# Patient Record
Sex: FEMALE | Race: BLACK OR AFRICAN AMERICAN | URBAN - METROPOLITAN AREA
[De-identification: names, ages, dates, MRNs, and addresses within clinical notes are randomized per-mention and may not be internally consistent; named-entity substitution may affect disease eponyms.]

---

## 2017-02-14 ENCOUNTER — OFFICE VISIT (OUTPATIENT)
Dept: URGENT CARE | Facility: URGENT CARE | Age: 22
End: 2017-02-14
Payer: COMMERCIAL

## 2017-02-14 VITALS
DIASTOLIC BLOOD PRESSURE: 68 MMHG | HEART RATE: 83 BPM | TEMPERATURE: 97.5 F | BODY MASS INDEX: 27.02 KG/M2 | WEIGHT: 157.4 LBS | SYSTOLIC BLOOD PRESSURE: 98 MMHG

## 2017-02-14 DIAGNOSIS — G44.209 TENSION-TYPE HEADACHE, NOT INTRACTABLE, UNSPECIFIED CHRONICITY PATTERN: Primary | ICD-10-CM

## 2017-02-14 DIAGNOSIS — J01.90 ACUTE SINUSITIS WITH SYMPTOMS > 10 DAYS: ICD-10-CM

## 2017-02-14 PROCEDURE — 99214 OFFICE O/P EST MOD 30 MIN: CPT | Performed by: PHYSICIAN ASSISTANT

## 2017-02-14 NOTE — MR AVS SNAPSHOT
After Visit Summary   2/14/2017    Silverio Stroud    MRN: [id number]           Patient Information     Date Of Birth          1995        Visit Information        Provider Department      2/14/2017 5:30 PM Tye Moon PA-C [facility]        Today's Diagnoses     Tension-type headache, not intractable, unspecified chronicity pattern    -  1    Acute sinusitis with symptoms > 10 days          Care Instructions       Antibiotic x10 days  Probiotic x 10 days  Ibuprofen 600mg three times a day for 2-3 days  Follow up with Primary care provider       HEADACHE [unspecified]    The cause of your headache today is not clear, but it does not appear to be the sign of any serious illness.  Under stress, some people tense the muscles of their shoulder, neck and scalp without knowing it. If this condition lasts long enough, a TENSION HEADACHE can occur.  A MIGRAINE HEADACHE is caused by changes in blood flow to the brain. It can be mild or severe. A migraine attack may be triggered by emotional stress, hormone changes during the menstrual cycle, oral contraceptives, alcohol use, certain foods containing tyramine, eye strain, weather changes, missing meals, lack of sleep or oversleeping.  Other causes of headache include a viral illness, sinus, ear or throat infection, dental pain and TMJ (jaw joint) pain.  HOME CARE:    If you were given pain medicine for this headache, do not drive yourself home. Arrange for a ride, instead. When you get home, try to sleep. You should feel much better when you wake up.    If you are having nausea or vomiting, follow a light diet until your headache is relieved.    If you have a migraine type headache, use sunglasses when in the daylight or around bright indoor lighting until symptoms improve. Bright glaring light can worsen this kind of headache.  FOLLOW UP with your doctor if the headache is not better within the next 24 hours. If you have  frequent headaches you should discuss a treatment plan with your primary care doctor. By being aware of the earliest signs of headache, and starting treatment right away, you may be able to stop the pain yourself.  GET PROMPT MEDICAL ATTENTION if any of the following occur:    Worsening of your head pain or no improvement within 24 hours    Repeated vomiting (unable to keep liquids down)    Fever over 101 F (38.3 C)    Stiff neck    Extreme drowsiness, confusion or fainting    Weakness of an arm or leg or one side of the face    Difficulty with speech or vision    0766-9359 [facility], [address] [place] [address]1939. All rights reserved. This information is not intended as a substitute for professional medical care. Always follow your healthcare professional's instructions.    Self-Care for Headaches  Most headaches aren't serious and can be relieved with self-care. But some headaches may be a sign of another health problem like eye trouble or high blood pressure. To find the best treatment, learn what kind of headaches you get. For tension headaches, self-care will usually help. To treat migraines, ask your healthcare provider for advice. It is also possible to get both tension and migraine headaches. Self-care involves relieving the pain and avoiding headache  triggers  if you can.    Ways to reduce pain and tension  Try these steps:    Apply a cold compress or ice pack to the pain site.    Drink fluids. If nausea makes it hard to drink, try sucking on ice.    Rest. Protect yourself from bright light and loud noises.    Calm your emotions by imagining a peaceful scene.    Massage tight neck, shoulder, and head muscles.    To relax muscles, soak in a hot bath or use a hot shower.  Use medicines  Aspirin or aspirin substitutes, such as ibuprofen and acetaminophen, can relieve headache. Remember: Never give aspirin to anyone 18 years old or younger because of the risk of developing Reye syndrome. Use  "pain medicines only when necessary.  Track your headaches  Keeping a headache diary can help you and your healthcare provider identify what's causing your headaches:    Note when each headache happens.    Identify your activities and the foods you've eaten 6 to 8 hours before the headache began.    Look for any trends or \"triggers.\"  Signs of tension headache  Any of the following can be signs:    Dull pain or feeling of pressure in a tight band around your head    Pain in your neck or shoulders    Headache without a definite beginning or end    Headache after an activity such as driving or working on a computer  Signs of migraine  Any of the following can be signs:    Throbbing pain on one or both sides of your head    Nausea or vomiting    Extreme sensitivity to light, sound, and smells    Bright spots, flashes, or other visual changes    Pain or nausea so severe that you can't continue your daily activities  Call your healthcare provider   If you have any of the following symptoms, contact your healthcare provider:    A headache that lingers after a recent injury or bump to the head.    A fever with a stiff neck or pain when you bend your head toward your chest.    A headache along with slurred speech, changes in your vision, or numbness or weakness in your arms or legs.    A headache for longer than 3 days.    Frequent headaches, especially in the morning.    Headaches with seizures     Seek immediate medical attention if you have a headache that you would call \"the worst headache you have ever had.\"     6222-2536 [employer]. [address] [place] [address]. All rights reserved. This information is not intended as a substitute for professional medical care. Always follow your healthcare professional's instructions.        Sinus Headache    The sinuses are air-filled spaces within the bones of the face. They connect to the inside of the nose. Sinusitis is an inflammation of the tissue " lining the sinus cavity. Sinus inflammation can occur during a cold or hay fever (allergies to pollens and other particles in the air) and cause symptoms of sinus congestion and fullness and perhaps a low-grade fever. An infection is usually present when there is also facial pain or headache and green or yellow drainage from the nose or into the back of the throat (postnasal drip). Antibiotics are often prescribed to treat this condition.  Sinus headache may cause pain in different places, depending on which sinuses are infected. There may be pain in the temples, forehead, top of the head, behind or around the eye, across the cheekbone, or into the upper teeth.  You may find that changing your position, sitting upright or lying down, will bring some relief.  Home care  The following guidelines will help you care for yourself at home:  1. Drink plenty of water, hot tea, and other liquids to stay well hydrated. This thins the mucus and promotes sinus drainage.  2. Apply heat to the painful areas of the face. Use a towel soaked in hot water. Or,  the shower and direct the hot spray onto your face. This is a good way to inhale warm water vapor and get heat on your face at the same time. (Cover your mouth and nose with your hands so you can still breathe as you do this.)  3. Use a cool mist vaporizer at night or breathe in steam from a hot shower. Suck on peppermint, menthol, or eucalyptus hard candies during the day.  4. An expectorant containing guaifenesin helps thin the mucus and promote drainage from the sinuses.  5. Over-the-counter decongestants may be used unless a similar medicine was prescribed. Nasal sprays work the fastest. Use one that contains phenylephrine or oxymetazoline. First blow the nose gently to remove mucus. Then apply the drops. Don't use decongestant nasal sprays more often than the label says or for more than 3 days. This can make symptoms worse. Nasal sprays prescribed from your doctor  typically do not have these restrictions. Check with your doctor or pharmacist. You may also use tablets containing pseudoephedrine. Many sinus remedies combine ingredients, which may increase side effects. Also, if you are taking a combination medicine with another medicine, be sure you are not taking a double dose of anything by mistake. Read the labels or ask the pharmacist for help. [NOTE: Those with high blood pressure should not use decongestants. They can raise blood pressure.]  6. Antihistamines are useful if allergies are a cause of your sinusitis. You can get chlorpheniramine and diphenhydramine over the counter, but these can cause drowsiness. [NOTE: Don't use these if you have glaucoma or if you are a man with trouble urinating due to an enlarged prostate.] Over-the-counter antihistamines containing loratidine and cetirizine cause less drowsiness and are a good choice for daytime use.  7. When allergies are the cause for sinusitis, a saline nasal rinse may give relief. Saline nasal rinse reduces swelling and clears excess mucus. This allows sinuses to drain. Prepackaged kits are available at most drugstores. These contain premixed salt packets and an irrigation device. If antibiotics have been prescribed to treat an acute sinus infection, talk to your doctor before using a nasal rinse to be sure it is safe for you.  8. You may use acetaminophen or ibuprofen to control pain, unless another pain medicine was prescribed. [NOTE: If you have chronic liver or kidney disease or ever had a stomach ulcer, talk with your doctor before using these medicines.] (Aspirin should never be used in anyone under 18 years of age who has a fever. It may cause severe liver damage.)  9. If antibiotics were given, finish all of them, even if you are feeling better after a few days.  Follow-up care  Follow up with your doctor or this facility in 1 week or as instructed by our staff if not improving.  When to seek medical  care  Get prompt medical attention if any of the following occur:    Facial pain or headache becomes more severe    Stiff neck    Unusual drowsiness or confusion    Swelling of the forehead or eyelids    Vision problems including blurred or double vision    Fever over 100.4  F (38.0  C) oral for more than 3 days on antibiotics    Bleeding from the nose or throat    Seizure    3599-6032 [employer]. [address] [place] [address]. All rights reserved. This information is not intended as a substitute for professional medical care. Always follow your healthcare professional's instructions.              Follow-ups after your visit        Your next 10 appointments already scheduled     Feb 17, 2017  1:20 PM CST   Office Visit with Dasia Atwood MD   [facility]    [address] [place] [address]  [address]  [facility] [address] [phone]           Bring a current list of meds and any records pertaining to this visit.  For Physicals, please bring immunization records and any forms needing to be filled out.  Please arrive 10 minutes early to complete paperwork.              Who to contact     If you have questions or need follow up information about today's clinic visit or your schedule please contact [facility] directly at [phone].  Normal or non-critical lab and imaging results will be communicated to you by MyChart, letter or phone within 4 business days after the clinic has received the results. If you do not hear from us within 7 days, please contact the clinic through MyChart or phone. If you have a critical or abnormal lab result, we will notify you by phone as soon as possible.  Submit refill requests through [employer] or call your pharmacy and they will forward the refill request to us. Please allow 3 business days for your refill to be completed.          Additional Information About Your Visit        MyChart Information      [employer] gives you secure access to your electronic health record. If you see a primary care provider, you can also send messages to your care team and make appointments. If you have questions, please call your primary care clinic.  If you do not have a primary care provider, please call [phone] and they will assist you.        Care EveryWhere ID     This is your Care EveryWhere ID. This could be used by other organizations to access your [facility] medical records  [id number]        Your Vitals Were     Pulse Temperature BMI (Body Mass Index)             83 97.5  F (36.4  C) (Oral) 27.02 kg/m2          Blood Pressure from Last 3 Encounters:   02/14/17 98/68   12/06/16 120/78   11/15/16 110/64    Weight from Last 3 Encounters:   02/14/17 157 lb 6.4 oz (71.4 kg)   12/06/16 162 lb 4 oz (73.6 kg)   11/15/16 161 lb (73 kg)              We Performed the Following     CBC with platelets          Today's Medication Changes          These changes are accurate as of: 2/14/17  6:56 PM.  If you have any questions, ask your nurse or doctor.               Start taking these medicines.        Dose/Directions    amoxicillin-clavulanate 875-125 MG per tablet   Commonly known as:  AUGMENTIN   Used for:  Acute sinusitis with symptoms > 10 days   Started by:  Tye Moon PA-C        Dose:  1 tablet   Take 1 tablet by mouth 2 times daily for 10 days   Quantity:  20 tablet   Refills:  0            Where to get your medicines      [id number]  [facility] - [address] [facility]  [address] [address]     Phone:  [phone]     amoxicillin-clavulanate 875-125 MG per tablet                Primary Care Provider Office Phone # Fax #    [facility] [phone]       [address] [place] [address] Dr. ROSA DASILVA [id number]        Thank you!     Thank you for choosing [facility]  for your care. Our goal is always to  provide you with excellent care. Hearing back from our patients is one way we can continue to improve our services. Please take a few minutes to complete the written survey that you may receive in the mail after your visit with us. Thank you!             Your Updated Medication List - Protect others around you: Learn how to safely use, store and throw away your medicines at www.disposemymeds.org.          This list is accurate as of: 2/14/17  6:56 PM.  Always use your most recent med list.                   Brand Name Dispense Instructions for use    amoxicillin-clavulanate 875-125 MG per tablet    AUGMENTIN    20 tablet    Take 1 tablet by mouth 2 times daily for 10 days       Calcium Carb-Cholecalciferol 600-800 MG-UNIT Tabs     120 tablet    Take 1 tablet by mouth daily       CALCIUM CITRATE + D PO          cholecalciferol 68279 UNITS capsule    VITAMIN D3    8 capsule    Take 1 capsule (50,000 Units) by mouth once a week       clindamycin 1 % solution    CLEOCIN T    60 mL    Apply topically 2 times daily       levonorgestrel-ethinyl estradiol 0.1-20 MG-MCG per tablet    AVIANE,ALESSE,LESSINA    84 tablet    Take 1 tablet by mouth daily       traZODone 50 MG tablet    DESYREL    30 tablet    Take 1 tablet (50 mg) by mouth nightly as needed for sleep

## 2017-02-15 NOTE — PATIENT INSTRUCTIONS
Antibiotic x10 days  Probiotic x 10 days  Ibuprofen 600mg three times a day for 2-3 days  Follow up with Primary care provider       HEADACHE [unspecified]    The cause of your headache today is not clear, but it does not appear to be the sign of any serious illness.  Under stress, some people tense the muscles of their shoulder, neck and scalp without knowing it. If this condition lasts long enough, a TENSION HEADACHE can occur.  A MIGRAINE HEADACHE is caused by changes in blood flow to the brain. It can be mild or severe. A migraine attack may be triggered by emotional stress, hormone changes during the menstrual cycle, oral contraceptives, alcohol use, certain foods containing tyramine, eye strain, weather changes, missing meals, lack of sleep or oversleeping.  Other causes of headache include a viral illness, sinus, ear or throat infection, dental pain and TMJ (jaw joint) pain.  HOME CARE:    If you were given pain medicine for this headache, do not drive yourself home. Arrange for a ride, instead. When you get home, try to sleep. You should feel much better when you wake up.    If you are having nausea or vomiting, follow a light diet until your headache is relieved.    If you have a migraine type headache, use sunglasses when in the daylight or around bright indoor lighting until symptoms improve. Bright glaring light can worsen this kind of headache.  FOLLOW UP with your doctor if the headache is not better within the next 24 hours. If you have frequent headaches you should discuss a treatment plan with your primary care doctor. By being aware of the earliest signs of headache, and starting treatment right away, you may be able to stop the pain yourself.  GET PROMPT MEDICAL ATTENTION if any of the following occur:    Worsening of your head pain or no improvement within 24 hours    Repeated vomiting (unable to keep liquids down)    Fever over 101 F (38.3 C)    Stiff neck    Extreme drowsiness, confusion or  "fainting    Weakness of an arm or leg or one side of the face    Difficulty with speech or vision    5518-1821 [facility], [address] [place] [address], [place] [address]. All rights reserved. This information is not intended as a substitute for professional medical care. Always follow your healthcare professional's instructions.    Self-Care for Headaches  Most headaches aren't serious and can be relieved with self-care. But some headaches may be a sign of another health problem like eye trouble or high blood pressure. To find the best treatment, learn what kind of headaches you get. For tension headaches, self-care will usually help. To treat migraines, ask your healthcare provider for advice. It is also possible to get both tension and migraine headaches. Self-care involves relieving the pain and avoiding headache  triggers  if you can.    Ways to reduce pain and tension  Try these steps:    Apply a cold compress or ice pack to the pain site.    Drink fluids. If nausea makes it hard to drink, try sucking on ice.    Rest. Protect yourself from bright light and loud noises.    Calm your emotions by imagining a peaceful scene.    Massage tight neck, shoulder, and head muscles.    To relax muscles, soak in a hot bath or use a hot shower.  Use medicines  Aspirin or aspirin substitutes, such as ibuprofen and acetaminophen, can relieve headache. Remember: Never give aspirin to anyone 18 years old or younger because of the risk of developing Reye syndrome. Use pain medicines only when necessary.  Track your headaches  Keeping a headache diary can help you and your healthcare provider identify what's causing your headaches:    Note when each headache happens.    Identify your activities and the foods you've eaten 6 to 8 hours before the headache began.    Look for any trends or \"triggers.\"  Signs of tension headache  Any of the following can be signs:    Dull pain or feeling of pressure in a tight band around your " "head    Pain in your neck or shoulders    Headache without a definite beginning or end    Headache after an activity such as driving or working on a computer  Signs of migraine  Any of the following can be signs:    Throbbing pain on one or both sides of your head    Nausea or vomiting    Extreme sensitivity to light, sound, and smells    Bright spots, flashes, or other visual changes    Pain or nausea so severe that you can't continue your daily activities  Call your healthcare provider   If you have any of the following symptoms, contact your healthcare provider:    A headache that lingers after a recent injury or bump to the head.    A fever with a stiff neck or pain when you bend your head toward your chest.    A headache along with slurred speech, changes in your vision, or numbness or weakness in your arms or legs.    A headache for longer than 3 days.    Frequent headaches, especially in the morning.    Headaches with seizures     Seek immediate medical attention if you have a headache that you would call \"the worst headache you have ever had.\"     5814-7453 [employer]. [address] [place] [address]. All rights reserved. This information is not intended as a substitute for professional medical care. Always follow your healthcare professional's instructions.        Sinus Headache    The sinuses are air-filled spaces within the bones of the face. They connect to the inside of the nose. Sinusitis is an inflammation of the tissue lining the sinus cavity. Sinus inflammation can occur during a cold or hay fever (allergies to pollens and other particles in the air) and cause symptoms of sinus congestion and fullness and perhaps a low-grade fever. An infection is usually present when there is also facial pain or headache and green or yellow drainage from the nose or into the back of the throat (postnasal drip). Antibiotics are often prescribed to treat this condition.  Sinus headache may " cause pain in different places, depending on which sinuses are infected. There may be pain in the temples, forehead, top of the head, behind or around the eye, across the cheekbone, or into the upper teeth.  You may find that changing your position, sitting upright or lying down, will bring some relief.  Home care  The following guidelines will help you care for yourself at home:  1. Drink plenty of water, hot tea, and other liquids to stay well hydrated. This thins the mucus and promotes sinus drainage.  2. Apply heat to the painful areas of the face. Use a towel soaked in hot water. Or,  the shower and direct the hot spray onto your face. This is a good way to inhale warm water vapor and get heat on your face at the same time. (Cover your mouth and nose with your hands so you can still breathe as you do this.)  3. Use a cool mist vaporizer at night or breathe in steam from a hot shower. Suck on peppermint, menthol, or eucalyptus hard candies during the day.  4. An expectorant containing guaifenesin helps thin the mucus and promote drainage from the sinuses.  5. Over-the-counter decongestants may be used unless a similar medicine was prescribed. Nasal sprays work the fastest. Use one that contains phenylephrine or oxymetazoline. First blow the nose gently to remove mucus. Then apply the drops. Don't use decongestant nasal sprays more often than the label says or for more than 3 days. This can make symptoms worse. Nasal sprays prescribed from your doctor typically do not have these restrictions. Check with your doctor or pharmacist. You may also use tablets containing pseudoephedrine. Many sinus remedies combine ingredients, which may increase side effects. Also, if you are taking a combination medicine with another medicine, be sure you are not taking a double dose of anything by mistake. Read the labels or ask the pharmacist for help. [NOTE: Those with high blood pressure should not use decongestants. They  can raise blood pressure.]  6. Antihistamines are useful if allergies are a cause of your sinusitis. You can get chlorpheniramine and diphenhydramine over the counter, but these can cause drowsiness. [NOTE: Don't use these if you have glaucoma or if you are a man with trouble urinating due to an enlarged prostate.] Over-the-counter antihistamines containing loratidine and cetirizine cause less drowsiness and are a good choice for daytime use.  7. When allergies are the cause for sinusitis, a saline nasal rinse may give relief. Saline nasal rinse reduces swelling and clears excess mucus. This allows sinuses to drain. Prepackaged kits are available at most drugstores. These contain premixed salt packets and an irrigation device. If antibiotics have been prescribed to treat an acute sinus infection, talk to your doctor before using a nasal rinse to be sure it is safe for you.  8. You may use acetaminophen or ibuprofen to control pain, unless another pain medicine was prescribed. [NOTE: If you have chronic liver or kidney disease or ever had a stomach ulcer, talk with your doctor before using these medicines.] (Aspirin should never be used in anyone under 18 years of age who has a fever. It may cause severe liver damage.)  9. If antibiotics were given, finish all of them, even if you are feeling better after a few days.  Follow-up care  Follow up with your doctor or this facility in 1 week or as instructed by our staff if not improving.  When to seek medical care  Get prompt medical attention if any of the following occur:    Facial pain or headache becomes more severe    Stiff neck    Unusual drowsiness or confusion    Swelling of the forehead or eyelids    Vision problems including blurred or double vision    Fever over 100.4  F (38.0  C) oral for more than 3 days on antibiotics    Bleeding from the nose or throat    Seizure    7450-2292 [employer]. [address] [place] [address], [place] [address]. All rights  reserved. This information is not intended as a substitute for professional medical care. Always follow your healthcare professional's instructions.

## 2017-02-15 NOTE — PROGRESS NOTES
SUBJECTIVE:  Silverio Stroud is a 21 year old female who comes in for evaluation of headache.  Headache began 2week(s)}ago and is intermittent episodes lasting hours at a time.      DESCRIPTION OF HEADACHE:   Location of pain: bilateral   Radiation of pain?: NO   Character of pain:pressure-like   Severity of pain: moderate   Accompanying symptoms: sinus congestion, facial pressure   Prodromal sx?: none   Rapidity of onset: gradual     History of Migranes: No   Are most headaches similar in presentation? YES    TYPICAL PRECIPITANTS OF HEADACHE: stress and caffeine withdrawal    CURRENT USE OF MEDS TO TREAT HA:   Abortive meds? none today   Daily use? NO   Prophylactic meds? none    ADDITIONAL RELEVANT HISTORY:  Exposure to carbon monoxide? NO  Substance use: caffeine: 1-2 cups daily, none today  Neurologic ROS: lightheadedness    Past Medical History   Diagnosis Date     Elevated hemoglobin A1c      Irregular menses      Vitamin D deficiency      Current Outpatient Prescriptions   Medication Sig Dispense Refill     traZODone (DESYREL) 50 MG tablet Take 1 tablet (50 mg) by mouth nightly as needed for sleep 30 tablet 3     clindamycin (CLEOCIN T) 1 % external solution Apply topically 2 times daily 60 mL 11     levonorgestrel-ethinyl estradiol (AVIANE,ALESSE,LESSINA) 0.1-20 MG-MCG per tablet Take 1 tablet by mouth daily 84 tablet 3     Calcium Citrate-Vitamin D (CALCIUM CITRATE + D PO)        Calcium Carb-Cholecalciferol 600-800 MG-UNIT TABS Take 1 tablet by mouth daily 120 tablet 3     cholecalciferol (VITAMIN D3) 45899 UNITS capsule Take 1 capsule (50,000 Units) by mouth once a week 8 capsule 0     Social History   Substance Use Topics     Smoking status: Never Smoker     Smokeless tobacco: Not on file     Alcohol use No       ROS:   Review of systems negative except as stated above.      OBJECTIVE:  BP 98/68  Pulse 83  Temp 97.5  F (36.4  C) (Oral)  Wt 157 lb 6.4 oz (71.4 kg)  BMI 27.02 kg/m2  GENERAL APPEARANCE:  healthy, alert and no distress  EYES: EOMI,  PERRL, conjunctiva clear  HENT: Frontal sinus tenderness bilaterally ear canals and TM's normal.  Nose and mouth without ulcers, erythema or lesions  NECK: supple, nontender, no lymphadenopathy  RESP: lungs clear to auscultation - no rales, rhonchi or wheezes  CV: regular rates and rhythm, normal S1 S2, no murmur noted  NEURO: Normal strength and tone, sensory exam grossly normal,  normal speech and mentation  SKIN: no suspicious lesions or rashes    ASSESSMENT:(G44.209) Tension-type headache, not intractable, unspecified chronicity pattern  (primary encounter diagnosis)  Comment: Mild persistent presentation, bilateral, no neuro deficit.   Plan: Ice packs, Ibuprofen, relaxation techniques  Follow up with PCP if symptoms worsen or fail to improve with 2-3 days of therapy            (J01.90) Acute sinusitis with symptoms > 10 days  Comment:  Sinuus pressure  Plan: DISCONTINUED: amoxicillin-clavulanate         (AUGMENTIN) 875-125 MG per tablet        Probiototics  Follow up with PCP if symptoms worsen or fail to improve with 2-3 days of therapy

## 2017-02-17 ENCOUNTER — OFFICE VISIT (OUTPATIENT)
Dept: PEDIATRICS | Facility: CLINIC | Age: 22
End: 2017-02-17
Payer: COMMERCIAL

## 2017-02-17 VITALS
BODY MASS INDEX: 26.46 KG/M2 | DIASTOLIC BLOOD PRESSURE: 76 MMHG | TEMPERATURE: 97.9 F | SYSTOLIC BLOOD PRESSURE: 102 MMHG | OXYGEN SATURATION: 99 % | HEART RATE: 92 BPM | HEIGHT: 64 IN | WEIGHT: 155 LBS

## 2017-02-17 DIAGNOSIS — M94.0 COSTOCHONDRITIS: Primary | ICD-10-CM

## 2017-02-17 DIAGNOSIS — G44.209 TENSION HEADACHE: ICD-10-CM

## 2017-02-17 PROCEDURE — 99214 OFFICE O/P EST MOD 30 MIN: CPT | Performed by: PEDIATRICS

## 2017-02-17 NOTE — NURSING NOTE
"Chief Complaint   Patient presents with     Chest Pain     Headache       Initial /76 (BP Location: Right arm, Patient Position: Chair, Cuff Size: Adult Regular)  Pulse 92  Temp 97.9  F (36.6  C) (Oral)  Ht 5' 4\" (1.626 m)  Wt 155 lb (70.3 kg)  LMP 01/27/2017  SpO2 99%  Breastfeeding? No  BMI 26.61 kg/m2 Estimated body mass index is 26.61 kg/(m^2) as calculated from the following:    Height as of this encounter: 5' 4\" (1.626 m).    Weight as of this encounter: 155 lb (70.3 kg).  Medication Reconciliation: complete  "

## 2017-02-17 NOTE — PROGRESS NOTES
"  SUBJECTIVE:                                                    Silverio Stroud is a 21 year old female who presents to clinic today for the following health issues:    C/o HA x 2 weeks, top of head, left side and rt side.  Patient seen here 3 days ago for headache and was given anitbiotics for possible sinus infection. She did not start these - state she was nervous to take them.  No vision changes, no nausea or vomiting. Pain not awakening her from sleep.  She has constant pain throughout the day. She is currently a student in art school.  LMP 1/29 - headaches started around time of period.  Drinks moderate amount of water, has 1-2 lattes per day, no soda.       chest pain x 2 days - center of chest, pressure--no trauma, no relationship to food, not worse with exertion, no palpitations.  Has not tried anything for pain relief yet.    Problem list and histories reviewed & adjusted, as indicated.  Additional history: as documented    Problem list, Medication list, Allergies, and Medical/Social/Surgical histories reviewed in EPIC and updated as appropriate.    ROS:  Constitutional, HEENT, cardiovascular, pulmonary, gi and gu systems are negative, except as otherwise noted.    OBJECTIVE:                                                    /76 (BP Location: Right arm, Patient Position: Chair, Cuff Size: Adult Regular)  Pulse 92  Temp 97.9  F (36.6  C) (Oral)  Ht 5' 4\" (1.626 m)  Wt 155 lb (70.3 kg)  LMP 01/27/2017  SpO2 99%  Breastfeeding? No  BMI 26.61 kg/m2  Body mass index is 26.61 kg/(m^2).  GENERAL APPEARANCE: healthy, alert and no distress  EYES: Eyes grossly normal to inspection, PERRL and conjunctivae and sclerae normal  HENT: ear canals and TM's normal and nose and mouth without ulcers or lesions  NECK: no adenopathy, no asymmetry, masses, or scars and thyroid normal to palpation  RESP: lungs clear to auscultation - no rales, rhonchi or wheezes  CV: regular rates and rhythm, normal S1 S2, no S3 or S4 and " no murmur, click or rub  NEURO: Normal strength and tone, mentation intact, speech normal, DTR symmetrically normal in lower extremities, cranial nerves 2-12 intact and normal strength throughout  CHEST: +reproducable chest wall tenderness over upper sternum    Diagnostic Test Results:  none      ASSESSMENT/PLAN:                                                            1. Costochondritis  See PI    2. Tension headache  See PI - I don't think she has a sinus infection, does not need to take anitbiotics.       Patient Instructions     Headache:  1. Make sure your are drinking plenty of water - your urine should be clear  2. The ibuprofen you are taking for the costochondritis will help your headache.  3. If not feeling better in a week, please follow up with me.    Chest pain (costochondritis):  1. Take over the counter ibuprofen 400mg (2 tabs) three times per day with food.      Costochondritis  Costochondritis is inflammation of a rib or the cartilage that connects a rib to your breastbone (sternum). It causes tenderness, and sometimes chest pain may be sharp or aching, or it may feel like pressure. Pain may get worse with deep breathing, movement, or exercise. In some cases, the pain is mistaken for a heart attack. Despite this, the condition is not serious. Read on to learn more about the condition and how it can be treated.    What causes costochondritis?  The cause of costochondritis is not completely clear, but it may happen after a chest injury, chest infection or coughing episode. Some physical activities can sometimes lead to costochondritis. Large-breasted women may be more likely to have the condition. Often, the reason for the inflammation is unknown.  Diagnosing costochondritis  There is no test for costochrondritis. The condition is diagnosed by the symptoms you have. In some cases, tests are done to rule out more serious problems. These tests may include imaging tests such as chest X-ray or CT  scan.  Treating costochondritis  If an underlying cause is found, treatment for that will likely relieve the problem. Costochondritis often goes away on its own. The course of the condition varies from person to person. It usually lasts from weeks to months. In some cases, mild symptoms continue for months to years. To ease symptoms:    Take medications as directed. These relieve pain and swelling. Ibuprofen or other NSAIDs are often recommended. In some cases, you may be given prescription medication, such as muscle relaxants.    Avoid activities that put stress on the chest or spine.    Apply a heating pad (set to warm, not to high, heat) to the breastbone several times a day.    Perform stretching exercises as directed  Call the health care provider right away if you have any of the following:    Pain that is not relieved by medication    Shortness of breath    Lightheadedness, dizziness, or fainting    Feeling of irregular heartbeat or fast pulse  Anyone with chest pain should see a doctor, especially those who are older and may be at risk for heart disease.     [id number] [employer]. [address] [place] [address], [place] [address]. All rights reserved. This information is not intended as a substitute for professional medical care. Always follow your healthcare professional's instructions.            Dasia Atwood MD  [facility]

## 2017-02-17 NOTE — PATIENT INSTRUCTIONS
Headache:  1. Make sure your are drinking plenty of water - your urine should be clear  2. The ibuprofen you are taking for the costochondritis will help your headache.  3. If not feeling better in a week, please follow up with me.    Chest pain (costochondritis):  1. Take over the counter ibuprofen 400mg (2 tabs) three times per day with food.      Costochondritis  Costochondritis is inflammation of a rib or the cartilage that connects a rib to your breastbone (sternum). It causes tenderness, and sometimes chest pain may be sharp or aching, or it may feel like pressure. Pain may get worse with deep breathing, movement, or exercise. In some cases, the pain is mistaken for a heart attack. Despite this, the condition is not serious. Read on to learn more about the condition and how it can be treated.    What causes costochondritis?  The cause of costochondritis is not completely clear, but it may happen after a chest injury, chest infection or coughing episode. Some physical activities can sometimes lead to costochondritis. Large-breasted women may be more likely to have the condition. Often, the reason for the inflammation is unknown.  Diagnosing costochondritis  There is no test for costochrondritis. The condition is diagnosed by the symptoms you have. In some cases, tests are done to rule out more serious problems. These tests may include imaging tests such as chest X-ray or CT scan.  Treating costochondritis  If an underlying cause is found, treatment for that will likely relieve the problem. Costochondritis often goes away on its own. The course of the condition varies from person to person. It usually lasts from weeks to months. In some cases, mild symptoms continue for months to years. To ease symptoms:    Take medications as directed. These relieve pain and swelling. Ibuprofen or other NSAIDs are often recommended. In some cases, you may be given prescription medication, such as muscle relaxants.    Avoid  activities that put stress on the chest or spine.    Apply a heating pad (set to warm, not to high, heat) to the breastbone several times a day.    Perform stretching exercises as directed  Call the health care provider right away if you have any of the following:    Pain that is not relieved by medication    Shortness of breath    Lightheadedness, dizziness, or fainting    Feeling of irregular heartbeat or fast pulse  Anyone with chest pain should see a doctor, especially those who are older and may be at risk for heart disease.     [id number] [employer]. [address] [place] [address]. All rights reserved. This information is not intended as a substitute for professional medical care. Always follow your healthcare professional's instructions.

## 2017-02-17 NOTE — MR AVS SNAPSHOT
After Visit Summary   2/17/2017    Silverio Stroud    MRN: [id number]           Patient Information     Date Of Birth          1995        Visit Information        Provider Department      2/17/2017 1:15 PM Dasia Atwood MD; [facility]        Today's Diagnoses     Costochondritis    -  1    Tension headache          Care Instructions    Headache:  1. Make sure your are drinking plenty of water - your urine should be clear  2. The ibuprofen you are taking for the costochondritis will help your headache.  3. If not feeling better in a week, please follow up with me.    Chest pain (costochondritis):  1. Take over the counter ibuprofen 400mg (2 tabs) three times per day with food.      Costochondritis  Costochondritis is inflammation of a rib or the cartilage that connects a rib to your breastbone (sternum). It causes tenderness, and sometimes chest pain may be sharp or aching, or it may feel like pressure. Pain may get worse with deep breathing, movement, or exercise. In some cases, the pain is mistaken for a heart attack. Despite this, the condition is not serious. Read on to learn more about the condition and how it can be treated.    What causes costochondritis?  The cause of costochondritis is not completely clear, but it may happen after a chest injury, chest infection or coughing episode. Some physical activities can sometimes lead to costochondritis. Large-breasted women may be more likely to have the condition. Often, the reason for the inflammation is unknown.  Diagnosing costochondritis  There is no test for costochrondritis. The condition is diagnosed by the symptoms you have. In some cases, tests are done to rule out more serious problems. These tests may include imaging tests such as chest X-ray or CT scan.  Treating costochondritis  If an underlying cause is found, treatment for that will likely relieve the problem. Costochondritis often goes  away on its own. The course of the condition varies from person to person. It usually lasts from weeks to months. In some cases, mild symptoms continue for months to years. To ease symptoms:    Take medications as directed. These relieve pain and swelling. Ibuprofen or other NSAIDs are often recommended. In some cases, you may be given prescription medication, such as muscle relaxants.    Avoid activities that put stress on the chest or spine.    Apply a heating pad (set to warm, not to high, heat) to the breastbone several times a day.    Perform stretching exercises as directed  Call the health care provider right away if you have any of the following:    Pain that is not relieved by medication    Shortness of breath    Lightheadedness, dizziness, or fainting    Feeling of irregular heartbeat or fast pulse  Anyone with chest pain should see a doctor, especially those who are older and may be at risk for heart disease.     8014-4813 [employer]. [address] [place] [address]. All rights reserved. This information is not intended as a substitute for professional medical care. Always follow your healthcare professional's instructions.              Follow-ups after your visit        Who to contact     If you have questions or need follow up information about today's clinic visit or your schedule please contact [facility] directly at [phone].  Normal or non-critical lab and imaging results will be communicated to you by MyChart, letter or phone within 4 business days after the clinic has received the results. If you do not hear from us within 7 days, please contact the clinic through [employer]M Solutions[employer] or phone. If you have a critical or abnormal lab result, we will notify you by phone as soon as possible.  Submit refill requests through Sign[employer]d or call your pharmacy and they will forward the refill request to us. Please allow 3 business days for your refill to be completed.           "Additional Information About Your Visit        Escapiohart Information     diaDexus gives you secure access to your electronic health record. If you see a primary care provider, you can also send messages to your care team and make appointments. If you have questions, please call your primary care clinic.  If you do not have a primary care provider, please call [phone] and they will assist you.        Care EveryWhere ID     This is your Care EveryWhere ID. This could be used by other organizations to access your [place] medical records  [id number]        Your Vitals Were     Pulse Temperature Height Last Period Pulse Oximetry Breastfeeding?    92 97.9  F (36.6  C) (Oral) 5' 4\" (1.626 m) 01/27/2017 99% No    BMI (Body Mass Index)                   26.61 kg/m2            Blood Pressure from Last 3 Encounters:   02/17/17 102/76   02/14/17 98/68   12/06/16 120/78    Weight from Last 3 Encounters:   02/17/17 155 lb (70.3 kg)   02/14/17 157 lb 6.4 oz (71.4 kg)   12/06/16 162 lb 4 oz (73.6 kg)              Today, you had the following     No orders found for display         Today's Medication Changes          These changes are accurate as of: 2/17/17  1:58 PM.  If you have any questions, ask your nurse or doctor.               Stop taking these medicines if you haven't already. Please contact your care team if you have questions.     amoxicillin-clavulanate 875-125 MG per tablet   Commonly known as:  AUGMENTIN   Stopped by:  Dasia Atwood MD           Calcium Carb-Cholecalciferol 600-800 MG-UNIT Tabs   Stopped by:  Dasia Atwood MD           cholecalciferol 30120 UNITS capsule   Commonly known as:  VITAMIN D3   Stopped by:  Dasia Atwood MD           clindamycin 1 % solution   Commonly known as:  CLEOCIN T   Stopped by:  Dasia Atwood MD           levonorgestrel-ethinyl estradiol 0.1-20 MG-MCG per tablet   Commonly known as:  CECILIO DE ANDA LESSINA   Stopped by:  Dasia Atwood MD        "    traZODone 50 MG tablet   Commonly known as:  DESYREL   Stopped by:  Dasia Atwood MD                    Primary Care Provider Office Phone # Fax #    [facility] [place] [phone] [address] [place] [address]        Thank you!     Thank you for choosing [facility]  for your care. Our goal is always to provide you with excellent care. Hearing back from our patients is one way we can continue to improve our services. Please take a few minutes to complete the written survey that you may receive in the mail after your visit with us. Thank you!             Your Updated Medication List - Protect others around you: Learn how to safely use, store and throw away your medicines at www.disposemymeds.org.          This list is accurate as of: 2/17/17  1:58 PM.  Always use your most recent med list.                   Brand Name Dispense Instructions for use    CALCIUM CITRATE + D PO

## 2017-03-06 ENCOUNTER — OFFICE VISIT (OUTPATIENT)
Dept: PEDIATRICS | Facility: CLINIC | Age: 22
End: 2017-03-06
Payer: COMMERCIAL

## 2017-03-06 ENCOUNTER — RADIANT APPOINTMENT (OUTPATIENT)
Dept: GENERAL RADIOLOGY | Facility: CLINIC | Age: 22
End: 2017-03-06
Attending: PEDIATRICS
Payer: COMMERCIAL

## 2017-03-06 VITALS
WEIGHT: 159 LBS | HEART RATE: 89 BPM | SYSTOLIC BLOOD PRESSURE: 94 MMHG | DIASTOLIC BLOOD PRESSURE: 66 MMHG | BODY MASS INDEX: 27.29 KG/M2 | TEMPERATURE: 98.5 F | OXYGEN SATURATION: 99 %

## 2017-03-06 DIAGNOSIS — R07.89 STERNAL PAIN: Primary | ICD-10-CM

## 2017-03-06 DIAGNOSIS — R07.89 STERNAL PAIN: ICD-10-CM

## 2017-03-06 PROCEDURE — 71020 XR CHEST 2 VW: CPT

## 2017-03-06 PROCEDURE — 99213 OFFICE O/P EST LOW 20 MIN: CPT | Performed by: PEDIATRICS

## 2017-03-06 NOTE — MR AVS SNAPSHOT
After Visit Summary   3/6/2017    Silverio Stroud    MRN: [id number]           Patient Information     Date Of Birth          1995        Visit Information        Provider Department      3/6/2017 11:15 AM Dasia Atwood MD; [employer] [facility] [place]        Today's Diagnoses     Sternal pain    -  1       Follow-ups after your visit        Who to contact     If you have questions or need follow up information about today's clinic visit or your schedule please contact [facility] directly at [phone].  Normal or non-critical lab and imaging results will be communicated to you by LUX[employer] Grouphart, letter or phone within 4 business days after the clinic has received the results. If you do not hear from us within 7 days, please contact the clinic through [employer] or phone. If you have a critical or abnormal lab result, we will notify you by phone as soon as possible.  Submit refill requests through [employer] Backstage or call your pharmacy and they will forward the refill request to us. Please allow 3 business days for your refill to be completed.          Additional Information About Your Visit        MyChart Information     [employer] gives you secure access to your electronic health record. If you see a primary care provider, you can also send messages to your care team and make appointments. If you have questions, please call your primary care clinic.  If you do not have a primary care provider, please call [phone] and they will assist you.        Care EveryWhere ID     This is your Care EveryWhere ID. This could be used by other organizations to access your [place] medical records  [id number]        Your Vitals Were     Pulse Temperature Last Period Pulse Oximetry Breastfeeding? BMI (Body Mass Index)    89 98.5  F (36.9  C) (Oral) 01/27/2017 99% No 27.29 kg/m2       Blood Pressure from Last 3 Encounters:   03/06/17 94/66   02/17/17 102/76   02/14/17 98/68    Weight  from Last 3 Encounters:   03/06/17 159 lb (72.1 kg)   02/17/17 155 lb (70.3 kg)   02/14/17 157 lb 6.4 oz (71.4 kg)                 Today's Medication Changes          These changes are accurate as of: 3/6/17  1:43 PM.  If you have any questions, ask your nurse or doctor.               Stop taking these medicines if you haven't already. Please contact your care team if you have questions.     CALCIUM CITRATE + D PO   Stopped by:  Dasia Atwood MD                    Primary Care Provider Fax #    [facility] [phone]       No address on file        Thank you!     Thank you for choosing [facility]  for your care. Our goal is always to provide you with excellent care. Hearing back from our patients is one way we can continue to improve our services. Please take a few minutes to complete the written survey that you may receive in the mail after your visit with us. Thank you!             Your Updated Medication List - Protect others around you: Learn how to safely use, store and throw away your medicines at www.disposemymeds.org.      Notice  As of 3/6/2017  1:43 PM    You have not been prescribed any medications.

## 2017-03-06 NOTE — LETTER
[facility] [place]  [address]  [address]  [place] [address] [phone]      March 6, 2017      Silverio Stroud  [address] [place] [address]        To whom it may concern,    Please excuse Silverio from her previously missed classes.  She is being treated for daily headaches and persistent chest wall pain. Her headaches were particuarly bad in the morning and she could not get the pain under control to attend class. She is under my continued care and was seen today, 3/6/17, in clinic for further evaluation of continued symptoms.          Sincerely,

## 2017-03-06 NOTE — PROGRESS NOTES
SUBJECTIVE:                                                    Silverio Stroud is a 21 year old female who presents to clinic today for the following health issues:    Follow up from 2/17/17 OV for tension HA and costochondritis. Pt states she stopped the Ibuprofen and pain has returned. She continues to have mid-sternal tenderness.  Denies trauma.    He headaches have improved. She did miss quite a few classes thought and is asking for a letter today for those excuses so she can still pass without taking 12 credits.      Problem list and histories reviewed & adjusted, as indicated.  Additional history: as documented    Reviewed and updated as needed this visit by clinical staff  Tobacco       Reviewed and updated as needed this visit by Provider         ROS:  Constitutional, HEENT, cardiovascular, pulmonary, gi and gu systems are negative, except as otherwise noted.    OBJECTIVE:                                                    BP 94/66 (BP Location: Right arm, Patient Position: Chair, Cuff Size: Adult Regular)  Pulse 89  Temp 98.5  F (36.9  C) (Oral)  Wt 159 lb (72.1 kg)  LMP 01/27/2017  SpO2 99%  Breastfeeding? No  BMI 27.29 kg/m2  Body mass index is 27.29 kg/(m^2).  GENERAL APPEARANCE: healthy, alert and no distress  CHEST wall: +reproducable sternal tenderness, no bruising or other skin changes    Diagnostic Test Results:  CXR normal     ASSESSMENT/PLAN:                                                        1. Sternal pain  Continued sternal tenderness.  CXR negative for bony pathology, still most c/w costochondritis.  May continue to use over the counter NSAID, no more than 3 times per week.  - XR Chest 2 Views; Future    Letter given for past absences due to headaches and chest wall tenderness.    Dasia Atwood MD  [facility]

## 2017-03-16 ENCOUNTER — OFFICE VISIT (OUTPATIENT)
Dept: PEDIATRICS | Facility: CLINIC | Age: 22
End: 2017-03-16
Payer: COMMERCIAL

## 2017-03-16 VITALS
WEIGHT: 157 LBS | DIASTOLIC BLOOD PRESSURE: 70 MMHG | BODY MASS INDEX: 26.95 KG/M2 | OXYGEN SATURATION: 99 % | SYSTOLIC BLOOD PRESSURE: 102 MMHG | TEMPERATURE: 97.7 F | HEART RATE: 73 BPM

## 2017-03-16 DIAGNOSIS — M94.0 COSTOCHONDRITIS: Primary | ICD-10-CM

## 2017-03-16 PROCEDURE — 99212 OFFICE O/P EST SF 10 MIN: CPT | Performed by: PEDIATRICS

## 2017-03-16 RX ORDER — MULTIVITAMIN WITH IRON
1 TABLET ORAL DAILY
Qty: 30 TABLET | COMMUNITY
Start: 2017-03-16 | End: 2018-08-28

## 2017-03-16 NOTE — MR AVS SNAPSHOT
After Visit Summary   3/16/2017    Silverio Stroud    MRN: [id number]           Patient Information     Date Of Birth          1995        Visit Information        Provider Department      3/16/2017 11:15 AM Dasia Atwood MD; [employer] [facility] [place]        Today's Diagnoses     Costochondritis    -  1       Follow-ups after your visit        Who to contact     If you have questions or need follow up information about today's clinic visit or your schedule please contact [facility] directly at [phone].  Normal or non-critical lab and imaging results will be communicated to you by Border Stylohart, letter or phone within 4 business days after the clinic has received the results. If you do not hear from us within 7 days, please contact the clinic through Spurflyt or phone. If you have a critical or abnormal lab result, we will notify you by phone as soon as possible.  Submit refill requests through [employer] or call your pharmacy and they will forward the refill request to us. Please allow 3 business days for your refill to be completed.          Additional Information About Your Visit        MyChart Information     [employer] gives you secure access to your electronic health record. If you see a primary care provider, you can also send messages to your care team and make appointments. If you have questions, please call your primary care clinic.  If you do not have a primary care provider, please call [phone] and they will assist you.        Care EveryWhere ID     This is your Care EveryWhere ID. This could be used by other organizations to access your [place] medical records  [id number]        Your Vitals Were     Pulse Temperature Last Period Pulse Oximetry Breastfeeding? BMI (Body Mass Index)    73 97.7  F (36.5  C) (Oral) 01/27/2017 99% No 26.95 kg/m2       Blood Pressure from Last 3 Encounters:   03/16/17 102/70   03/06/17 94/66   02/17/17 102/76     Weight from Last 3 Encounters:   03/16/17 157 lb (71.2 kg)   03/06/17 159 lb (72.1 kg)   02/17/17 155 lb (70.3 kg)              Today, you had the following     No orders found for display       Primary Care Provider Fax #    Yonatan Lindsey [phone]       No address on file        Thank you!     Thank you for choosing [facility] [place]  for your care. Our goal is always to provide you with excellent care. Hearing back from our patients is one way we can continue to improve our services. Please take a few minutes to complete the written survey that you may receive in the mail after your visit with us. Thank you!             Your Updated Medication List - Protect others around you: Learn how to safely use, store and throw away your medicines at www.disposemymeds.org.          This list is accurate as of: 3/16/17 11:49 AM.  Always use your most recent med list.                   Brand Name Dispense Instructions for use    cholecalciferol 1000 UNIT tablet    vitamin D    100 tablet    Take 1 tablet (1,000 Units) by mouth daily       magnesium 250 MG tablet     30 tablet    Take 1 tablet by mouth daily

## 2017-03-16 NOTE — NURSING NOTE
"Chief Complaint   Patient presents with     Patient Request for Note/Letter       Initial /70 (BP Location: Right arm, Patient Position: Chair, Cuff Size: Adult Regular)  Pulse 73  Temp 97.7  F (36.5  C) (Oral)  Wt 157 lb (71.2 kg)  LMP 01/27/2017  SpO2 99%  Breastfeeding? No  BMI 26.95 kg/m2 Estimated body mass index is 26.95 kg/(m^2) as calculated from the following:    Height as of 2/17/17: 5' 4\" (1.626 m).    Weight as of this encounter: 157 lb (71.2 kg).  Medication Reconciliation: complete  "

## 2017-06-05 ENCOUNTER — OFFICE VISIT (OUTPATIENT)
Dept: PEDIATRICS | Facility: CLINIC | Age: 22
End: 2017-06-05
Payer: COMMERCIAL

## 2017-06-05 VITALS
HEART RATE: 80 BPM | TEMPERATURE: 98.2 F | DIASTOLIC BLOOD PRESSURE: 62 MMHG | WEIGHT: 150 LBS | HEIGHT: 64 IN | BODY MASS INDEX: 25.61 KG/M2 | OXYGEN SATURATION: 98 % | SYSTOLIC BLOOD PRESSURE: 98 MMHG

## 2017-06-05 DIAGNOSIS — N92.6 IRREGULAR MENSTRUAL CYCLE: Primary | ICD-10-CM

## 2017-06-05 DIAGNOSIS — N93.8 DUB (DYSFUNCTIONAL UTERINE BLEEDING): ICD-10-CM

## 2017-06-05 DIAGNOSIS — L65.9 HAIR LOSS: ICD-10-CM

## 2017-06-05 DIAGNOSIS — Z71.3 NORMAL NUTRITION MONITORING ENCOUNTER: ICD-10-CM

## 2017-06-05 PROCEDURE — 82728 ASSAY OF FERRITIN: CPT | Performed by: PEDIATRICS

## 2017-06-05 PROCEDURE — 99214 OFFICE O/P EST MOD 30 MIN: CPT | Performed by: PEDIATRICS

## 2017-06-05 PROCEDURE — 84443 ASSAY THYROID STIM HORMONE: CPT | Performed by: PEDIATRICS

## 2017-06-05 PROCEDURE — 36415 COLL VENOUS BLD VENIPUNCTURE: CPT | Performed by: PEDIATRICS

## 2017-06-05 PROCEDURE — 83540 ASSAY OF IRON: CPT | Performed by: PEDIATRICS

## 2017-06-05 PROCEDURE — 83550 IRON BINDING TEST: CPT | Performed by: PEDIATRICS

## 2017-06-05 PROCEDURE — 80053 COMPREHEN METABOLIC PANEL: CPT | Performed by: PEDIATRICS

## 2017-06-05 NOTE — NURSING NOTE
"Chief Complaint   Patient presents with     Weight Check     dietary management       Initial BP 98/62 (BP Location: Right arm, Cuff Size: Adult Regular)  Pulse 80  Temp 98.2  F (36.8  C)  Ht 5' 4\" (1.626 m)  Wt 150 lb (68 kg)  LMP 06/04/2017  SpO2 98%  BMI 25.75 kg/m2 Estimated body mass index is 25.75 kg/(m^2) as calculated from the following:    Height as of this encounter: 5' 4\" (1.626 m).    Weight as of this encounter: 150 lb (68 kg).  Medication Reconciliation: complete   Heide Maharaj MA    "

## 2017-06-05 NOTE — MR AVS SNAPSHOT
After Visit Summary   6/5/2017    Silverio Stroud    MRN: [id number]           Patient Information     Date Of Birth          1995        Visit Information        Provider Department      6/5/2017 3:45 PM Dasia Atwood MD; [employer] [facility] [place]        Care Instructions      Will recheck labs for early periods - if still normal, will plan to restart birth control.      MyPlate Worksheet: 1,800 Calories  Your calorie needs are about 1,800 calories a day. Below are the U.S. Department of Agriculture (USDA) guidelines for your daily recommended amount of each food group.  Vegetables  2  cups Fruits  1  cups Grains  6 ounces Dairy  3 cups Protein  5 ounces   Eat a variety of vegetables each day.  Aim for these amounts each week:    1  cups dark green vegetables    5  cups red or orange-colored vegetables    1  cups dry beans and peas    5 cups starchy vegetables    4 cups other vegetables Eat a variety of fruits each day.  Go easy on fruit juices.  Good choices of fruits include:    Berries    Bananas    Apples    Melon    Dry fruit    Frozen fruit    Canned fruit Choose whole grains whenever you can.  Aim to eat at least 3 ounces of whole grains each day:    Bread    Cereal    Rice    Pasta    Potatoes    Tortillas Choose low-fat or fat-free milk, yogurt, or cheese each day.  Good choices include:    Low-fat or fat-free milk or chocolate milk    Low-fat or fat-free yogurt    Low-fat or fat-free cottage cheese or other reduced-fat cheeses    Calcium-fortified milk alternatives Choose low-fat or lean meats, poultry, fish and seafood each day.  Vary your protein. Choose more:    Fish and other seafood    Lean low-fat meat and poultry    Eggs    Beans, peas    Tofu    Unsalted nuts and seeds  Choose less high-fat and red meat.   Source: USDA MyPlate, www.choosemyplate.gov  Know your limits on oils (fats) and sugars:    Your allowance for oils is 24 grams or about 5  "teaspoons a day (oil includes vegetable oil, mayonnaise, soft margarine, salad dressing, nuts, olives, avocados, and some fish).    Limit the extras (solid fats and sugars, also called \"empty calories\") to 130 calories a day.    Cut back on salt (sodium). Stay under 2,300 mg sodium a day. If you have a health condition such as heart disease or high blood pressure, your doctor will likely tell you to limit sodium to no more than 1,500 mg a day.  Get moving and be active!  Aim for at least 30 minutes of physical activity most days of the week or 150 minutes of exercise a week.  MyPlate Servings Worksheet: 1,800 calories  This worksheet tells you how many servings you should get each day from each food group, and tells you how much food makes a serving. Use this as a guide as you plan your meals throughout the day. Track your progress daily by writing in what you actually ate.  Food Group  Daily MyPlate Goal  What You Ate Today    Vegetables 5 Half-cups or 5 Servings  One serving is:    cup cut-up raw or cooked vegetables  1 cup raw, leafy vegetables    baked sweet potato    cup vegetable juice  Note: At meals, fill half your plate with vegetables and fruit.     Fruits 3 Half-cups or 3 Servings  One serving is:    cup fresh, frozen, or canned fruit  1 medium piece of fruit  1 cup of berries or melon    cup dried fruit    cup 100% fruit juice  Note: Make most choices fruit instead of juice.     Grains 6 Servings or 6 Ounces  One serving is:  1 slice bread  1 cup dry cereal    cup cooked rice, pasta, or cereal  1 5-inch tortilla  Note: Choose whole grains for at least half of your servings each day.     Dairy 3 Servings or 3 Cups  One serving is:  1 cup milk  1  ounces reduced-fat hard cheese  2 ounces processed cheese  1 cup low-fat yogurt  1/3 cup shredded cheese  Note: Choose low-fat or fat-free most often.     Protein 5 Servings or 5 Ounces  One serving is:  1 ounce cooked lean beef, pork, lamb, or ham  1 ounce " cooked chicken or turkey (no skin)  1 ounce cooked fish or shellfish (not fried)  1 egg    cup egg substitute    ounce nuts or seeds  1 tablespoon peanut or almond butter    cup cooked dry beans or peas    cup tofu  2 tablespoons hummus       6878-5185 [employer]. [address] [place] [address]. All rights reserved. This information is not intended as a substitute for professional medical care. Always follow your healthcare professional's instructions.        Nutrition and MyPlate: Fruit  Like vegetables, fruit contains fiber and plenty of vitamins. But the great thing about fruit is its flavor. If you have a sweet tooth or just want a little treat, fruit is the healthiest way to indulge. And you're probably not eating as much of it as you should. An apple a day doesn't cut it anymore. At mealtimes, make half your plate fruits and vegetables.      Nutrient-rich choices  Most fruit is seasonal. So, your options change with the time of year. Take advantage of the seasons to keep healthy eating fresh. Most of your fruit should come from whole fruit. Nutrient-rich choices include:    Any fruit that's fresh, frozen, or canned in its own juice (no added sugar).    100% fruit juice, such as orange juice. (Be aware that even 100% juice is high in calories, and juice has less fiber than whole fruit. One small glass a day is enough.)  What makes fruit less healthy?    Added fat, sugar, or processed flour makes fruit less healthy. This means desserts like pastries, pies, and sorbet. Try a fruit salad or a smoothie instead.    Fruit canned in heavy syrup contains added sugar. Check the label to find out if the fruit is canned in syrup. If it is, rinse the fruit before eating it, and don't drink the syrup.    Juice with sugar added (not 100% fruit juice) contains a lot of calories and very little nutrition. You may already know that soda has a ton of sugar in it, but believe it or not, so do most juice  drinks! Instead, try sparkling water with a dash of juice.    Dried fruit has less vitamin C and more calories than fresh fruit. It's okay from time to time. Just remember, it s not as good for you as fresh fruit.  One small change  Next time you're at the grocery store, pick out two fruits you've never tried. Have a better idea? Write it here:     _____________________________________________________________    0176-6300 [employer]. [address] [place] [address], [place] [address]. All rights reserved. This information is not intended as a substitute for professional medical care. Always follow your healthcare professional's instructions.        Nutrition and MyPlate: Grains  Grains (also known as starches) make up foods such as bread, pasta, rice, cereal, and tortillas. Grains provide iron, B vitamins, and other nutrients the body needs to function. And they give your body fiber, which helps your digestion. Fiber also helps you manage your weight, because it's low in calories but fills you up.    Nutrient-rich choices  Whole grains are chock full of fiber. And they're not processed much, so they keep most of their nutrients. Make at least half the grains you eat whole grains. Good choices include:    Any bread or breakfast cereal that lists a whole grain (such as whole wheat or whole rolled oats) as the first ingredient. Ingredients are listed from most to least, so if a whole grain is first, you know the food has a lot of it.    Foods made with whole grains, such as oatmeal, barley soup, wild rice pilaf, and buckwheat (soba) noodles.  What makes grains less healthy?    As grains are processed (refined), they lose fiber and nutrients. White grains are often refined. This means white bread, white rice, and flour tortillas are not as healthy as whole-grain versions, such as whole-wheat bread, brown rice, or whole-grain tortillas.    Claims made on food packages can be misleading. Even if a package says the  food is a whole-grain product, check the ingredients. Unless a whole grain is listed first, the food isn't as healthy as the package makes it sound. Here's a hint: Wheat bread isn't a whole grain. Whole-wheat bread is. Make sure to read the fine print!    Added fat and sugar also make grains less healthy. This means cookies, pastries, donuts, snack cakes, sugar cereals  you get the idea.  One small change:  Find a whole-grain bread or cereal that you like. Have a better idea? Write it here:  _____________________________________________________________    6520-1392 [employer]. [address] [place] [address]. All rights reserved. This information is not intended as a substitute for professional medical care. Always follow your healthcare professional's instructions.        Nutrition and MyPlate: Oils  Oils are fats that are liquid at room temperature. This food group includes oils you cook with, plus foods that are mostly oil, such as mayonnaise and salad dressing. Oils give the body vitamin E and essential fatty acids, which keep cells and tissues healthy and help the body heal. But oils and other fats are high in calories. Eating too much fat leads to weight gain and increased risk of heart disease.    Fat facts  Some fats are liquid. Others are solid. And all of them can be bad for you if you eat too much. Food labels tell you which fats a food contains. Some are healthier than others:    Unsaturated fats are found in some oils (such as olive, peanut, and canola), nuts, seeds, and fish. These are the healthiest fats. They can be good for your heart in moderate amounts.    Saturated fats are found in animal foods such as butter, lard, beef, and high-fat dairy. These are less healthy, and should be limited.    Trans fats are found in some fast food, such as French fries, snack foods (like chips and cookies), and some margarines and shortenings. These are the worst fats for you. Avoid them  when you can.  Be smart about fats    Out with the Bad: Check food labels for trans fats. And stay away from foods that have them. Trans fats are mostly found in processed foods. So, choose unprocessed foods more often.    In with the Good: Choose unsaturated fat over saturated when you can. Here's one idea: Use olive or canola oil instead of lard or butter. What else could you do?  ___________________________________________________________________     ___________________________________________________________________    8126-7630 [employer]. [address] [place] [address]. All rights reserved. This information is not intended as a substitute for professional medical care. Always follow your healthcare professional's instructions.        Nutrition and MyPlate: Protein Foods  This group includes foods that are high in protein. Protein helps the body build new cells and keeps tissues healthy. Most Americans get enough protein without even trying. It can be harder for vegetarians, but plenty of non-meat foods are rich in protein, too. It s best to get protein from a variety of sources.    Nutrient-rich choices  There's a lot more to this food group than just meat and beans. It also includes nuts, seeds, and eggs. There are all sorts of nutrient-rich choices:    Chicken and turkey with the skin removed    Fish and shellfish    Lean beef, pork, or lamb (without visible fat)    Soy products, such as tofu, soybeans (edamame), tempeh, or soymilk    Black beans, kidney beans, maria beans, chickpeas (garbanzo beans), and lentils (Note: beans and peas count as both a protein and a vegetable)    Peanuts, almonds, walnuts, sesame seeds, and sunflower seeds, as well as foods made from these (such as peanut butter or tahini)    Eggs and foods made with eggs (such as quiche or frittata)  What makes meat and beans less healthy?    Fatty meat is not healthy. Before you cook meat, trim off all the fat you  can see. Chicken and turkey skin is also high in fat, and should be removed before cooking.    Breading and frying make food less healthy. This includes dishes like fried chicken, fried fish, and refried beans.    Sausage and lunch meats tend to be high in fat and salt. Buy low-fat, low-sodium versions.  One small change  Make a meal that includes a non-meat source of protein (such as tofu, lentils, or any other food listed above). Have a better idea? Write it here:  _____________________________________________________________    3767-0995 "[employer]. [address] [place] [address]. All rights reserved. This information is not intended as a substitute for professional medical care. Always follow your healthcare professional's instructions.        Nutrition and MyPlate: Vegetables  Vegetables are a major source of fiber. They re also packed with vitamins needed for health and growth. At mealtimes, make half your plate fruits and vegetables.    Nutrient-rich choices  Fresh, frozen, or canned--all vegetables are high in nutrients. The color of the skin tells you what s inside. So if you eat plenty of colors, you get a variety of nutrients. Some good choices include:    Dark green vegetables, such as spinach, liu greens, kale, and broccoli.    Bright red and orange vegetables, such as carrots, sweet potatoes, red bell peppers, and tomatoes.    Starchy vegetables, such as potatoes and squash.  What makes vegetables less healthy?    Boiling vegetables causes some vitamins to escape into the water. To hold on to vitamins, briefly steam, sauté, stir-tamayo, or microwave instead. Overcooking destroys vitamins, so try to keep vegetables a little crispy.    Using a lot of margarine, butter, or salad dressing adds fat and calories, but not many nutrients. A small amount of these toppings is okay. But the more you add, the more fat you add, too.    Frozen vegetables that come with cheese sauce or  other processed flavoring are high in fat and salt. It's healthier to season plain frozen vegetables yourself. Try fresh herbs, garlic, toasted almonds, or sesame seeds.    Canned vegetables often have lots of salt. Shop for low-sodium varieties.  One small change:  Sneak vegetables into every meal. Shred carrots into hamburger, or add zucchini to spaghetti and meatballs. You won't even notice! Have a better idea? Write it here:     ________________________________________________________    8443-9270 [employer]. [address] [place] [address]. All rights reserved. This information is not intended as a substitute for professional medical care. Always follow your healthcare professional's instructions.                Follow-ups after your visit        Who to contact     If you have questions or need follow up information about today's clinic visit or your schedule please contact [facility] [place] directly at [phone].  Normal or non-critical lab and imaging results will be communicated to you by MyChart, letter or phone within 4 business days after the clinic has received the results. If you do not hear from us within 7 days, please contact the clinic through [employer] or phone. If you have a critical or abnormal lab result, we will notify you by phone as soon as possible.  Submit refill requests through [employer] or call your pharmacy and they will forward the refill request to us. Please allow 3 business days for your refill to be completed.          Additional Information About Your Visit        [employer] Information     [employer] gives you secure access to your electronic health record. If you see a primary care provider, you can also send messages to your care team and make appointments. If you have questions, please call your primary care clinic.  If you do not have a primary care provider, please call [phone] and they will assist you.        Care EveryWhere ID     This is your  "Care EveryWhere ID. This could be used by other organizations to access your [facility] medical records  [id number]        Your Vitals Were     Pulse Temperature Height Last Period Pulse Oximetry BMI (Body Mass Index)    80 98.2  F (36.8  C) 5' 4\" (1.626 m) 06/04/2017 98% 25.75 kg/m2       Blood Pressure from Last 3 Encounters:   06/05/17 98/62   03/16/17 102/70   03/06/17 94/66    Weight from Last 3 Encounters:   06/05/17 150 lb (68 kg)   03/16/17 157 lb (71.2 kg)   03/06/17 159 lb (72.1 kg)              Today, you had the following     No orders found for display       [phone]       No address on file        Thank you!     Thank you for choosing [facility]  for your care. Our goal is always to provide you with excellent care. Hearing back from our patients is one way we can continue to improve our services. Please take a few minutes to complete the written survey that you may receive in the mail after your visit with us. Thank you!             Your Updated Medication List - Protect others around you: Learn how to safely use, store and throw away your medicines at www.disposemymeds.org.          This list is accurate as of: 6/5/17  4:41 PM.  Always use your most recent med list.                   Brand Name Dispense Instructions for use    cholecalciferol 1000 UNIT tablet    vitamin D    100 tablet    Take 1 tablet (1,000 Units) by mouth daily       magnesium 250 MG tablet     30 tablet    Take 1 tablet by mouth daily         "

## 2017-06-05 NOTE — PATIENT INSTRUCTIONS
"  Will recheck labs for early periods - if still normal, will plan to restart birth control.      MyPlate Worksheet: 1,800 Calories  Your calorie needs are about 1,800 calories a day. Below are the U.S. Department of Agriculture (USDA) guidelines for your daily recommended amount of each food group.  Vegetables  2  cups Fruits  1  cups Grains  6 ounces Dairy  3 cups Protein  5 ounces   Eat a variety of vegetables each day.  Aim for these amounts each week:    1  cups dark green vegetables    5  cups red or orange-colored vegetables    1  cups dry beans and peas    5 cups starchy vegetables    4 cups other vegetables Eat a variety of fruits each day.  Go easy on fruit juices.  Good choices of fruits include:    Berries    Bananas    Apples    Melon    Dry fruit    Frozen fruit    Canned fruit Choose whole grains whenever you can.  Aim to eat at least 3 ounces of whole grains each day:    Bread    Cereal    Rice    Pasta    Potatoes    Tortillas Choose low-fat or fat-free milk, yogurt, or cheese each day.  Good choices include:    Low-fat or fat-free milk or chocolate milk    Low-fat or fat-free yogurt    Low-fat or fat-free cottage cheese or other reduced-fat cheeses    Calcium-fortified milk alternatives Choose low-fat or lean meats, poultry, fish and seafood each day.  Vary your protein. Choose more:    Fish and other seafood    Lean low-fat meat and poultry    Eggs    Beans, peas    Tofu    Unsalted nuts and seeds  Choose less high-fat and red meat.   Source: USDA MyPlate, www.choosemyplate.gov  Know your limits on oils (fats) and sugars:    Your allowance for oils is 24 grams or about 5 teaspoons a day (oil includes vegetable oil, mayonnaise, soft margarine, salad dressing, nuts, olives, avocados, and some fish).    Limit the extras (solid fats and sugars, also called \"empty calories\") to 130 calories a day.    Cut back on salt (sodium). Stay under 2,300 mg sodium a day. If you have a health condition such as " heart disease or high blood pressure, your doctor will likely tell you to limit sodium to no more than 1,500 mg a day.  Get moving and be active!  Aim for at least 30 minutes of physical activity most days of the week or 150 minutes of exercise a week.  MyPlate Servings Worksheet: 1,800 calories  This worksheet tells you how many servings you should get each day from each food group, and tells you how much food makes a serving. Use this as a guide as you plan your meals throughout the day. Track your progress daily by writing in what you actually ate.  Food Group  Daily MyPlate Goal  What You Ate Today    Vegetables 5 Half-cups or 5 Servings  One serving is:    cup cut-up raw or cooked vegetables  1 cup raw, leafy vegetables    baked sweet potato    cup vegetable juice  Note: At meals, fill half your plate with vegetables and fruit.     Fruits 3 Half-cups or 3 Servings  One serving is:    cup fresh, frozen, or canned fruit  1 medium piece of fruit  1 cup of berries or melon    cup dried fruit    cup 100% fruit juice  Note: Make most choices fruit instead of juice.     Grains 6 Servings or 6 Ounces  One serving is:  1 slice bread  1 cup dry cereal    cup cooked rice, pasta, or cereal  1 5-inch tortilla  Note: Choose whole grains for at least half of your servings each day.     Dairy 3 Servings or 3 Cups  One serving is:  1 cup milk  1  ounces reduced-fat hard cheese  2 ounces processed cheese  1 cup low-fat yogurt  1/3 cup shredded cheese  Note: Choose low-fat or fat-free most often.     Protein 5 Servings or 5 Ounces  One serving is:  1 ounce cooked lean beef, pork, lamb, or ham  1 ounce cooked chicken or turkey (no skin)  1 ounce cooked fish or shellfish (not fried)  1 egg    cup egg substitute    ounce nuts or seeds  1 tablespoon peanut or almond butter    cup cooked dry beans or peas    cup tofu  2 tablespoons hummus       8805-5941 [employer]. [address] [place] [address], [place] [address]. All rights  reserved. This information is not intended as a substitute for professional medical care. Always follow your healthcare professional's instructions.        Nutrition and MyPlate: Fruit  Like vegetables, fruit contains fiber and plenty of vitamins. But the great thing about fruit is its flavor. If you have a sweet tooth or just want a little treat, fruit is the healthiest way to indulge. And you're probably not eating as much of it as you should. An apple a day doesn't cut it anymore. At mealtimes, make half your plate fruits and vegetables.      Nutrient-rich choices  Most fruit is seasonal. So, your options change with the time of year. Take advantage of the seasons to keep healthy eating fresh. Most of your fruit should come from whole fruit. Nutrient-rich choices include:    Any fruit that's fresh, frozen, or canned in its own juice (no added sugar).    100% fruit juice, such as orange juice. (Be aware that even 100% juice is high in calories, and juice has less fiber than whole fruit. One small glass a day is enough.)  What makes fruit less healthy?    Added fat, sugar, or processed flour makes fruit less healthy. This means desserts like pastries, pies, and sorbet. Try a fruit salad or a smoothie instead.    Fruit canned in heavy syrup contains added sugar. Check the label to find out if the fruit is canned in syrup. If it is, rinse the fruit before eating it, and don't drink the syrup.    Juice with sugar added (not 100% fruit juice) contains a lot of calories and very little nutrition. You may already know that soda has a ton of sugar in it, but believe it or not, so do most juice drinks! Instead, try sparkling water with a dash of juice.    Dried fruit has less vitamin C and more calories than fresh fruit. It's okay from time to time. Just remember, it s not as good for you as fresh fruit.  One small change  Next time you're at the grocery store, pick out two fruits you've never tried. Have a better idea?  Write it here:     _____________________________________________________________    2295-9786 [employer]. [address] [place] [address]. All rights reserved. This information is not intended as a substitute for professional medical care. Always follow your healthcare professional's instructions.        Nutrition and MyPlate: Grains  Grains (also known as starches) make up foods such as bread, pasta, rice, cereal, and tortillas. Grains provide iron, B vitamins, and other nutrients the body needs to function. And they give your body fiber, which helps your digestion. Fiber also helps you manage your weight, because it's low in calories but fills you up.    Nutrient-rich choices  Whole grains are chock full of fiber. And they're not processed much, so they keep most of their nutrients. Make at least half the grains you eat whole grains. Good choices include:    Any bread or breakfast cereal that lists a whole grain (such as whole wheat or whole rolled oats) as the first ingredient. Ingredients are listed from most to least, so if a whole grain is first, you know the food has a lot of it.    Foods made with whole grains, such as oatmeal, barley soup, wild rice pilaf, and buckwheat (soba) noodles.  What makes grains less healthy?    As grains are processed (refined), they lose fiber and nutrients. White grains are often refined. This means white bread, white rice, and flour tortillas are not as healthy as whole-grain versions, such as whole-wheat bread, brown rice, or whole-grain tortillas.    Claims made on food packages can be misleading. Even if a package says the food is a whole-grain product, check the ingredients. Unless a whole grain is listed first, the food isn't as healthy as the package makes it sound. Here's a hint: Wheat bread isn't a whole grain. Whole-wheat bread is. Make sure to read the fine print!    Added fat and sugar also make grains less healthy. This means cookies,  pastries, donuts, snack cakes, sugar cereals  you get the idea.  One small change:  Find a whole-grain bread or cereal that you like. Have a better idea? Write it here:  _____________________________________________________________    [id number] [employer]. [address] [place] [address]. All rights reserved. This information is not intended as a substitute for professional medical care. Always follow your healthcare professional's instructions.        Nutrition and MyPlate: Oils  Oils are fats that are liquid at room temperature. This food group includes oils you cook with, plus foods that are mostly oil, such as mayonnaise and salad dressing. Oils give the body vitamin E and essential fatty acids, which keep cells and tissues healthy and help the body heal. But oils and other fats are high in calories. Eating too much fat leads to weight gain and increased risk of heart disease.    Fat facts  Some fats are liquid. Others are solid. And all of them can be bad for you if you eat too much. Food labels tell you which fats a food contains. Some are healthier than others:    Unsaturated fats are found in some oils (such as olive, peanut, and canola), nuts, seeds, and fish. These are the healthiest fats. They can be good for your heart in moderate amounts.    Saturated fats are found in animal foods such as butter, lard, beef, and high-fat dairy. These are less healthy, and should be limited.    Trans fats are found in some fast food, such as French fries, snack foods (like chips and cookies), and some margarines and shortenings. These are the worst fats for you. Avoid them when you can.  Be smart about fats    Out with the Bad: Check food labels for trans fats. And stay away from foods that have them. Trans fats are mostly found in processed foods. So, choose unprocessed foods more often.    In with the Good: Choose unsaturated fat over saturated when you can. Here's one idea: Use olive or  canola oil instead of lard or butter. What else could you do?  ___________________________________________________________________     ___________________________________________________________________    [id number] [employer]. [address] [place] [address], [place] [address]. All rights reserved. This information is not intended as a substitute for professional medical care. Always follow your healthcare professional's instructions.        Nutrition and MyPlate: Protein Foods  This group includes foods that are high in protein. Protein helps the body build new cells and keeps tissues healthy. Most Americans get enough protein without even trying. It can be harder for vegetarians, but plenty of non-meat foods are rich in protein, too. It s best to get protein from a variety of sources.    Nutrient-rich choices  There's a lot more to this food group than just meat and beans. It also includes nuts, seeds, and eggs. There are all sorts of nutrient-rich choices:    Chicken and turkey with the skin removed    Fish and shellfish    Lean beef, pork, or lamb (without visible fat)    Soy products, such as tofu, soybeans (edamame), tempeh, or soymilk    Black beans, kidney beans, maria beans, chickpeas (garbanzo beans), and lentils (Note: beans and peas count as both a protein and a vegetable)    Peanuts, almonds, walnuts, sesame seeds, and sunflower seeds, as well as foods made from these (such as peanut butter or tahini)    Eggs and foods made with eggs (such as quiche or frittata)  What makes meat and beans less healthy?    Fatty meat is not healthy. Before you cook meat, trim off all the fat you can see. Chicken and turkey skin is also high in fat, and should be removed before cooking.    Breading and frying make food less healthy. This includes dishes like fried chicken, fried fish, and refried beans.    Sausage and lunch meats tend to be high in fat and salt. Buy low-fat, low-sodium versions.  One small  change  Make a meal that includes a non-meat source of protein (such as tofu, lentils, or any other food listed above). Have a better idea? Write it here:  _____________________________________________________________    0232-9796 [employer]. [address] [place] [address]. All rights reserved. This information is not intended as a substitute for professional medical care. Always follow your healthcare professional's instructions.        Nutrition and MyPlate: Vegetables  Vegetables are a major source of fiber. They re also packed with vitamins needed for health and growth. At mealtimes, make half your plate fruits and vegetables.    Nutrient-rich choices  Fresh, frozen, or canned--all vegetables are high in nutrients. The color of the skin tells you what s inside. So if you eat plenty of colors, you get a variety of nutrients. Some good choices include:    Dark green vegetables, such as spinach, liu greens, kale, and broccoli.    Bright red and orange vegetables, such as carrots, sweet potatoes, red bell peppers, and tomatoes.    Starchy vegetables, such as potatoes and squash.  What makes vegetables less healthy?    Boiling vegetables causes some vitamins to escape into the water. To hold on to vitamins, briefly steam, sauté, stir-tamayo, or microwave instead. Overcooking destroys vitamins, so try to keep vegetables a little crispy.    Using a lot of margarine, butter, or salad dressing adds fat and calories, but not many nutrients. A small amount of these toppings is okay. But the more you add, the more fat you add, too.    Frozen vegetables that come with cheese sauce or other processed flavoring are high in fat and salt. It's healthier to season plain frozen vegetables yourself. Try fresh herbs, garlic, toasted almonds, or sesame seeds.    Canned vegetables often have lots of salt. Shop for low-sodium varieties.  One small change:  Sneak vegetables into every meal. Shred carrots into  hamburger, or add zucchini to spaghetti and meatballs. You won't even notice! Have a better idea? Write it here:     ________________________________________________________    [id number] [employer]. [address] [place] [address], [place] [address]. All rights reserved. This information is not intended as a substitute for professional medical care. Always follow your healthcare professional's instructions.

## 2017-06-05 NOTE — PROGRESS NOTES
"  SUBJECTIVE:                                                    Silverio Stroud is a 22 year old female who presents to clinic today for the following health issues:      Wants to discuss dietary needs.  Also having abnormal periods - same as last year - coming more frequently, coming every 2 weeks.  Currently having period. They are more frequent than usual. Also more hair falling out than normal.    She would like to know what she should be eating every day - she does eat meat, but doesn't want to eat a lot of it.    Problem list and histories reviewed & adjusted, as indicated.  Additional history: as documented        Reviewed and updated as needed this visit by clinical staff       Reviewed and updated as needed this visit by Provider         ROS:  Constitutional, HEENT, cardiovascular, pulmonary, gi and gu systems are negative, except as otherwise noted.    OBJECTIVE:                                                    BP 98/62 (BP Location: Right arm, Cuff Size: Adult Regular)  Pulse 80  Temp 98.2  F (36.8  C)  Ht 5' 4\" (1.626 m)  Wt 150 lb (68 kg)  LMP 06/04/2017  SpO2 98%  BMI 25.75 kg/m2  Body mass index is 25.75 kg/(m^2).  HEAD: no bald patches, thick hair    Diagnostic Test Results:  none      ASSESSMENT/PLAN:                                                        1. Irregular menstrual cycle  If all normal, will plan to restart her birth control  - TSH with free T4 reflex  - Comprehensive metabolic panel    2. Hair loss  - Iron and iron binding capacity  - Ferritin    3. Normal nutrition monitoring encounter  Reviewed in detail myplate recommendations - see PI      See Patient Instructions    Dasia Atwood MD  [facility]    "

## 2017-06-06 LAB
ALBUMIN SERPL-MCNC: 4.1 G/DL (ref 3.4–5)
ALP SERPL-CCNC: 72 U/L (ref 40–150)
ALT SERPL W P-5'-P-CCNC: 22 U/L (ref 0–50)
ANION GAP SERPL CALCULATED.3IONS-SCNC: 10 MMOL/L (ref 3–14)
AST SERPL W P-5'-P-CCNC: 17 U/L (ref 0–45)
BILIRUB SERPL-MCNC: 0.5 MG/DL (ref 0.2–1.3)
BUN SERPL-MCNC: 12 MG/DL (ref 7–30)
CALCIUM SERPL-MCNC: 9 MG/DL (ref 8.5–10.1)
CHLORIDE SERPL-SCNC: 106 MMOL/L (ref 94–109)
CO2 SERPL-SCNC: 24 MMOL/L (ref 20–32)
CREAT SERPL-MCNC: 0.75 MG/DL (ref 0.52–1.04)
FERRITIN SERPL-MCNC: 109 NG/ML (ref 12–150)
GFR SERPL CREATININE-BSD FRML MDRD: NORMAL ML/MIN/1.7M2
GLUCOSE SERPL-MCNC: 86 MG/DL (ref 70–99)
IRON SATN MFR SERPL: 17 % (ref 15–46)
IRON SERPL-MCNC: 56 UG/DL (ref 35–180)
POTASSIUM SERPL-SCNC: 4.2 MMOL/L (ref 3.4–5.3)
PROT SERPL-MCNC: 8 G/DL (ref 6.8–8.8)
SODIUM SERPL-SCNC: 140 MMOL/L (ref 133–144)
TIBC SERPL-MCNC: 327 UG/DL (ref 240–430)
TSH SERPL DL<=0.005 MIU/L-ACNC: 0.6 MU/L (ref 0.4–4)

## 2017-06-07 RX ORDER — LEVONORGESTREL/ETHIN.ESTRADIOL 0.1-0.02MG
1 TABLET ORAL DAILY
Qty: 84 TABLET | Refills: 3 | Status: SHIPPED | OUTPATIENT
Start: 2017-06-07 | End: 2018-08-28

## 2017-12-01 ENCOUNTER — OFFICE VISIT (OUTPATIENT)
Dept: PEDIATRICS | Facility: CLINIC | Age: 22
End: 2017-12-01
Payer: COMMERCIAL

## 2017-12-01 VITALS
BODY MASS INDEX: 25.1 KG/M2 | WEIGHT: 147 LBS | TEMPERATURE: 98.1 F | OXYGEN SATURATION: 98 % | SYSTOLIC BLOOD PRESSURE: 98 MMHG | HEIGHT: 64 IN | DIASTOLIC BLOOD PRESSURE: 66 MMHG

## 2017-12-01 DIAGNOSIS — Z23 NEED FOR PROPHYLACTIC VACCINATION AND INOCULATION AGAINST INFLUENZA: ICD-10-CM

## 2017-12-01 DIAGNOSIS — E55.9 VITAMIN D DEFICIENCY: Primary | ICD-10-CM

## 2017-12-01 PROCEDURE — 36415 COLL VENOUS BLD VENIPUNCTURE: CPT | Performed by: PEDIATRICS

## 2017-12-01 PROCEDURE — 99213 OFFICE O/P EST LOW 20 MIN: CPT | Mod: 25 | Performed by: PEDIATRICS

## 2017-12-01 PROCEDURE — 82306 VITAMIN D 25 HYDROXY: CPT | Performed by: PEDIATRICS

## 2017-12-01 PROCEDURE — 90686 IIV4 VACC NO PRSV 0.5 ML IM: CPT | Performed by: PEDIATRICS

## 2017-12-01 PROCEDURE — 90471 IMMUNIZATION ADMIN: CPT | Performed by: PEDIATRICS

## 2017-12-01 ASSESSMENT — PATIENT HEALTH QUESTIONNAIRE - PHQ9: SUM OF ALL RESPONSES TO PHQ QUESTIONS 1-9: 9

## 2017-12-01 NOTE — PATIENT INSTRUCTIONS
Check vitamin D today.    Flu shot    Recommend scheduling physical exam with PAP smear  Pap  Does this test have other names?  Pap smear, cervical cytology, Papanicolaou test, Pap smear test, vaginal smear technique  What is this test?  This test check the cells from inside the cervix for any changes that could lead to cancer. The cervix is the lower part of a woman's uterus that opens into the vagina.  This test is named after Jacoby Barragan MD, one of the doctors who developed this technique of testing for cervical cancer.  Why do I need this test?  The Pap test is most often used as a screening test to look for cervical cancer or changes in cervical cells that might eventually lead to cancer. Major medical groups generally recommend that women get regular Pap tests, usually every 2 to 3 years, starting at age 21. Getting a regular Pap test can be life-saving. Cervical cancer is one of the most serious types of cancer in women.  If your test shows abnormal cells, your healthcare provider may be able to find and treat cervical problems right away, or stop cervical cancer before it becomes life-threatening. Pap tests can also diagnose serious infections and pelvic inflammation.   What other tests might I have along with this test?  You will likely have a pelvic exam along with this test. Depending on your age and other factors, your tissue samples may also be tested for human papillomavirus (HPV) infection at the same time your Pap test is done. Infection with some types of HPV puts you at risk for cervical cancer.  If you have an abnormal Pap test result, your healthcare provider may order other tests. These may include:    Colposcopy. Your cervix and vagina are looked at with a microscope called a colposcope, which magnifies any abnormal areas.    Endocervical curettage. Cells are taken from the opening of your cervix with a spoon-shaped tool and looked at under a microscope. This may be done during the  colposcopy.    Biopsy. A small tissue sample is taken from your cervix and looked at under a microscope. This may be done during the colposcopy.   What do my test results mean?  Many things may affect your lab test results. These include the method each lab uses to do the test. Even if your test results are different from the normal value, you may not have a problem. To learn what the results mean for you, talk with your healthcare provider.  Your results will either be normal or abnormal. If you get an abnormal result, this usually does not mean that you have cancer. It often means a minor cervical problem. Your healthcare provider may do another Pap test to confirm the initial results. Or he or she may recommend other tests such as colposcopy.  Occasionally a lab test has a false-positive result. This means you do not have a cervical problem even though the test result shows you do.   How is this test done?  This test requires a sample of cervical cells. For the test, you lie on your back with your knees bent and your feet in stirrups, then relax and spread your legs. As part of a pelvic exam, your healthcare provider first checks your vagina and reproductive organs for infections and health problems.  Then your provider uses a device called a speculum to open the vagina. The provider examines your cervix and scrapes off a few cells from inside your cervix..  Some women may have slight discomfort when the speculum is inserted.  Does this test pose any risks?  This test poses no known risks.  What might affect my test results?  Using vaginal lubricants, cleansers, contraceptives, or creams may mask your symptoms. Avoid using vaginal douches and abstain from sex for 2 days before an exam. Using these products or having sex may wash away or disguise abnormal cervical cells.  How do I get ready for this test?  It may seem like a good idea to wash up before having a Pap test, but this can actually erase the signs of a  health problem. For accurate test results, avoid having sex or using tampons, douches, vaginal creams, deodorant sprays and powders, and contraceptive foams and jellies for 2 days before your exam.  Don't have the test while you're menstruating. The ideal time to have a Pap test is 10 to 20 days after the first day of your last period.             [id number] [employer]. [address] [place] [address], [place] [address]. All rights reserved. This information is not intended as a substitute for professional medical care. Always follow your healthcare professional's instructions.

## 2017-12-01 NOTE — MR AVS SNAPSHOT
After Visit Summary   12/1/2017    Silverio Stroud    MRN: [id number]           Patient Information     Date Of Birth          1995        Visit Information        Provider Department      12/1/2017 11:30 AM Dasia Atwood MD; [employer] [facility] [place]        Today's Diagnoses     Need for prophylactic vaccination and inoculation against influenza    -  1    Vitamin D deficiency          Care Instructions    Check vitamin D today.    Flu shot    Recommend scheduling physical exam with PAP smear  Pap  Does this test have other names?  Pap smear, cervical cytology, Papanicolaou test, Pap smear test, vaginal smear technique  What is this test?  This test check the cells from inside the cervix for any changes that could lead to cancer. The cervix is the lower part of a woman's uterus that opens into the vagina.  This test is named after Jacoby Barragan MD, one of the doctors who developed this technique of testing for cervical cancer.  Why do I need this test?  The Pap test is most often used as a screening test to look for cervical cancer or changes in cervical cells that might eventually lead to cancer. Major medical groups generally recommend that women get regular Pap tests, usually every 2 to 3 years, starting at age 21. Getting a regular Pap test can be life-saving. Cervical cancer is one of the most serious types of cancer in women.  If your test shows abnormal cells, your healthcare provider may be able to find and treat cervical problems right away, or stop cervical cancer before it becomes life-threatening. Pap tests can also diagnose serious infections and pelvic inflammation.   What other tests might I have along with this test?  You will likely have a pelvic exam along with this test. Depending on your age and other factors, your tissue samples may also be tested for human papillomavirus (HPV) infection at the same time your Pap test is done.  Infection with some types of HPV puts you at risk for cervical cancer.  If you have an abnormal Pap test result, your healthcare provider may order other tests. These may include:    Colposcopy. Your cervix and vagina are looked at with a microscope called a colposcope, which magnifies any abnormal areas.    Endocervical curettage. Cells are taken from the opening of your cervix with a spoon-shaped tool and looked at under a microscope. This may be done during the colposcopy.    Biopsy. A small tissue sample is taken from your cervix and looked at under a microscope. This may be done during the colposcopy.   What do my test results mean?  Many things may affect your lab test results. These include the method each lab uses to do the test. Even if your test results are different from the normal value, you may not have a problem. To learn what the results mean for you, talk with your healthcare provider.  Your results will either be normal or abnormal. If you get an abnormal result, this usually does not mean that you have cancer. It often means a minor cervical problem. Your healthcare provider may do another Pap test to confirm the initial results. Or he or she may recommend other tests such as colposcopy.  Occasionally a lab test has a false-positive result. This means you do not have a cervical problem even though the test result shows you do.   How is this test done?  This test requires a sample of cervical cells. For the test, you lie on your back with your knees bent and your feet in stirrups, then relax and spread your legs. As part of a pelvic exam, your healthcare provider first checks your vagina and reproductive organs for infections and health problems.  Then your provider uses a device called a speculum to open the vagina. The provider examines your cervix and scrapes off a few cells from inside your cervix..  Some women may have slight discomfort when the speculum is inserted.  Does this test pose any  risks?  This test poses no known risks.  What might affect my test results?  Using vaginal lubricants, cleansers, contraceptives, or creams may mask your symptoms. Avoid using vaginal douches and abstain from sex for 2 days before an exam. Using these products or having sex may wash away or disguise abnormal cervical cells.  How do I get ready for this test?  It may seem like a good idea to wash up before having a Pap test, but this can actually erase the signs of a health problem. For accurate test results, avoid having sex or using tampons, douches, vaginal creams, deodorant sprays and powders, and contraceptive foams and jellies for 2 days before your exam.  Don't have the test while you're menstruating. The ideal time to have a Pap test is 10 to 20 days after the first day of your last period.             9755-0920 [employer]. [address] [place] [address]. All rights reserved. This information is not intended as a substitute for professional medical care. Always follow your healthcare professional's instructions.                Follow-ups after your visit        Who to contact     If you have questions or need follow up information about today's clinic visit or your schedule please contact [facility] directly at [phone].  Normal or non-critical lab and imaging results will be communicated to you by Nuvilexhart, letter or phone within 4 business days after the clinic has received the results. If you do not hear from us within 7 days, please contact the clinic through Nuvilexhart or phone. If you have a critical or abnormal lab result, we will notify you by phone as soon as possible.  Submit refill requests through [employer] or call your pharmacy and they will forward the refill request to us. Please allow 3 business days for your refill to be completed.          Additional Information About Your Visit        [employer] Information     [employer] gives you secure access to your  "electronic health record. If you see a primary care provider, you can also send messages to your care team and make appointments. If you have questions, please call your primary care clinic.  If you do not have a primary care provider, please call [phone] and they will assist you.        Care EveryWhere ID     This is your Care EveryWhere ID. This could be used by other organizations to access your [place] medical records  [id number]        Your Vitals Were     Temperature Height Pulse Oximetry BMI (Body Mass Index)          98.1  F (36.7  C) (Oral) 5' 4\" (1.626 m) 98% 25.23 kg/m2         Blood Pressure from Last 3 Encounters:   12/01/17 98/66   06/05/17 98/62   03/16/17 102/70    Weight from Last 3 Encounters:   12/01/17 147 lb (66.7 kg)   06/05/17 150 lb (68 kg)   03/16/17 157 lb (71.2 kg)              We Performed the Following     FLU VAC, SPLIT VIRUS IM > 3 YO (QUADRIVALENT) [04780]     Vaccine Administration, Initial [94836]     Vitamin D Deficiency        Primary Care Provider Office Phone # Fax #    Dasia Atwood [phone] [address] [place] [address]        Equal Access to Services     [facility]: Hadii carolyn ku hadasho Soomaali, waaxda luqadaha, qaybta kaalmada adeegyada, sofía oliver . So [facility] [phone].    ATENCIÓN: Si habla español, tiene a london disposición servicios gratuitos de asistencia lingüística. Llame al [phone].    We comply with applicable federal civil rights laws and [place] laws. We do not discriminate on the basis of race, color, national origin, age, disability, sex, sexual orientation, or gender identity.            Thank you!     [facility]  [facility]. Our goal is always to provide you with excellent care. Hearing back from our patients is one way we can continue to improve our services. Please take a few minutes to complete the written survey that you may " receive in the mail after your visit with us. Thank you!             Your Updated Medication List - Protect others around you: Learn how to safely use, store and throw away your medicines at www.disposemymeds.org.          This list is accurate as of: 12/1/17 12:02 PM.  Always use your most recent med list.                   Brand Name Dispense Instructions for use Diagnosis    cholecalciferol 1000 UNIT tablet    vitamin D3    100 tablet    Take 1 tablet (1,000 Units) by mouth daily        levonorgestrel-ethinyl estradiol 0.1-20 MG-MCG per tablet    AVIANE,ALESSE,LESSINA    84 tablet    Take 1 tablet by mouth daily    DUB (dysfunctional uterine bleeding)       magnesium 250 MG tablet     30 tablet    Take 1 tablet by mouth daily

## 2017-12-01 NOTE — NURSING NOTE
"Chief Complaint   Patient presents with     Blood Draw     Vitamin D        Initial BP 98/66  Temp 98.1  F (36.7  C) (Oral)  Ht 5' 4\" (1.626 m)  Wt 147 lb (66.7 kg)  SpO2 98%  BMI 25.23 kg/m2 Estimated body mass index is 25.23 kg/(m^2) as calculated from the following:    Height as of this encounter: 5' 4\" (1.626 m).    Weight as of this encounter: 147 lb (66.7 kg).  Medication Reconciliation: complete   Jennifer Linn MA    "

## 2017-12-01 NOTE — PROGRESS NOTES
"  SUBJECTIVE:   Silverio Stroud is a 22 year old female who presents to clinic today for the following health issues:      Patient requesting to check vitamin D level - h/o VDD.  Patient is NOT taking any supplements right now.  She denies any symptoms - no body aches or fatigue.    Problem list and histories reviewed & adjusted, as indicated.  Additional history: as documented    Reviewed and updated as needed this visit by clinical staff  Tobacco  Allergies  Meds  Med Hx  Surg Hx  Fam Hx  Soc Hx      Reviewed and updated as needed this visit by Provider         ROS:  Constitutional, HEENT, cardiovascular, pulmonary, gi and gu systems are negative, except as otherwise noted.      OBJECTIVE:   BP 98/66  Temp 98.1  F (36.7  C) (Oral)  Ht 5' 4\" (1.626 m)  Wt 147 lb (66.7 kg)  SpO2 98%  BMI 25.23 kg/m2  Body mass index is 25.23 kg/(m^2).  NA    Diagnostic Test Results:  none     ASSESSMENT/PLAN:       1. Vitamin D deficiency  check  - Vitamin D Deficiency    2. Need for prophylactic vaccination and inoculation against influenza  - FLU VAC, SPLIT VIRUS IM > 3 YO (QUADRIVALENT) [76225]  - Vaccine Administration, Initial [37384]    Discussed female physical and PAP with patient - she did not seem very familiar with this - will give her reading material in instructions.    Patient Instructions   Check vitamin D today.    Flu shot    Recommend scheduling physical exam with PAP smear  Pap  Does this test have other names?  Pap smear, cervical cytology, Papanicolaou test, Pap smear test, vaginal smear technique  What is this test?  This test check the cells from inside the cervix for any changes that could lead to cancer. The cervix is the lower part of a woman's uterus that opens into the vagina.  This test is named after Jacoby Barragan MD, one of the doctors who developed this technique of testing for cervical cancer.  Why do I need this test?  The Pap test is most often used as a screening test to look " for cervical cancer or changes in cervical cells that might eventually lead to cancer. Major medical groups generally recommend that women get regular Pap tests, usually every 2 to 3 years, starting at age 21. Getting a regular Pap test can be life-saving. Cervical cancer is one of the most serious types of cancer in women.  If your test shows abnormal cells, your healthcare provider may be able to find and treat cervical problems right away, or stop cervical cancer before it becomes life-threatening. Pap tests can also diagnose serious infections and pelvic inflammation.   What other tests might I have along with this test?  You will likely have a pelvic exam along with this test. Depending on your age and other factors, your tissue samples may also be tested for human papillomavirus (HPV) infection at the same time your Pap test is done. Infection with some types of HPV puts you at risk for cervical cancer.  If you have an abnormal Pap test result, your healthcare provider may order other tests. These may include:    Colposcopy. Your cervix and vagina are looked at with a microscope called a colposcope, which magnifies any abnormal areas.    Endocervical curettage. Cells are taken from the opening of your cervix with a spoon-shaped tool and looked at under a microscope. This may be done during the colposcopy.    Biopsy. A small tissue sample is taken from your cervix and looked at under a microscope. This may be done during the colposcopy.   What do my test results mean?  Many things may affect your lab test results. These include the method each lab uses to do the test. Even if your test results are different from the normal value, you may not have a problem. To learn what the results mean for you, talk with your healthcare provider.  Your results will either be normal or abnormal. If you get an abnormal result, this usually does not mean that you have cancer. It often means a minor cervical problem. Your  healthcare provider may do another Pap test to confirm the initial results. Or he or she may recommend other tests such as colposcopy.  Occasionally a lab test has a false-positive result. This means you do not have a cervical problem even though the test result shows you do.   How is this test done?  This test requires a sample of cervical cells. For the test, you lie on your back with your knees bent and your feet in stirrups, then relax and spread your legs. As part of a pelvic exam, your healthcare provider first checks your vagina and reproductive organs for infections and health problems.  Then your provider uses a device called a speculum to open the vagina. The provider examines your cervix and scrapes off a few cells from inside your cervix..  Some women may have slight discomfort when the speculum is inserted.  Does this test pose any risks?  This test poses no known risks.  What might affect my test results?  Using vaginal lubricants, cleansers, contraceptives, or creams may mask your symptoms. Avoid using vaginal douches and abstain from sex for 2 days before an exam. Using these products or having sex may wash away or disguise abnormal cervical cells.  How do I get ready for this test?  It may seem like a good idea to wash up before having a Pap test, but this can actually erase the signs of a health problem. For accurate test results, avoid having sex or using tampons, douches, vaginal creams, deodorant sprays and powders, and contraceptive foams and jellies for 2 days before your exam.  Don't have the test while you're menstruating. The ideal time to have a Pap test is 10 to 20 days after the first day of your last period.             9980-8007 [employer]. [address] [place] [address]. All rights reserved. This information is not intended as a substitute for professional medical care. Always follow your healthcare professional's instructions.            Dasia Atwood,  MD  [facility]    Injectable Influenza Immunization Documentation    1.  Is the person to be vaccinated sick today?   No    2. Does the person to be vaccinated have an allergy to a component   of the vaccine?   No  E[employer] Allergy Algorithm Link    3. Has the person to be vaccinated ever had a serious reaction   to influenza vaccine in the past?   No    4. Has the person to be vaccinated ever had Guillain-Barré syndrome?   No    Form completed by Jennifer Linn MA

## 2017-12-04 LAB — DEPRECATED CALCIDIOL+CALCIFEROL SERPL-MC: 24 UG/L (ref 20–75)

## 2017-12-31 ENCOUNTER — HEALTH MAINTENANCE LETTER (OUTPATIENT)
Age: 22
End: 2017-12-31

## 2018-04-05 ENCOUNTER — TELEPHONE (OUTPATIENT)
Dept: PEDIATRICS | Facility: CLINIC | Age: 23
End: 2018-04-05

## 2018-04-05 NOTE — TELEPHONE ENCOUNTER
Panel Management Review      Patient has the following on her problem list: None      Composite cancer screening  Chart review shows that this patient is due/due soon for the following Pap Smear  Summary:    Patient is due/failing the following:   PAP    Action needed:   Patient needs office visit for Px and preventive testing.    Type of outreach:    Phone, left message for patient to call back.     Questions for provider review:    None                                                                                                                                    Pretty CLARKE, ALEJANDRINA,AAMA       Chart routed to Care Team .

## 2018-07-31 ENCOUNTER — OFFICE VISIT (OUTPATIENT)
Dept: PEDIATRICS | Facility: CLINIC | Age: 23
End: 2018-07-31
Payer: COMMERCIAL

## 2018-07-31 VITALS
BODY MASS INDEX: 26.8 KG/M2 | HEART RATE: 96 BPM | OXYGEN SATURATION: 99 % | TEMPERATURE: 98.2 F | HEIGHT: 64 IN | DIASTOLIC BLOOD PRESSURE: 66 MMHG | SYSTOLIC BLOOD PRESSURE: 108 MMHG | WEIGHT: 157 LBS

## 2018-07-31 DIAGNOSIS — H93.12 TINNITUS, LEFT: ICD-10-CM

## 2018-07-31 DIAGNOSIS — H66.92 ACUTE OTITIS MEDIA WITH PERFORATION, LEFT: Primary | ICD-10-CM

## 2018-07-31 DIAGNOSIS — H72.92 ACUTE OTITIS MEDIA WITH PERFORATION, LEFT: Primary | ICD-10-CM

## 2018-07-31 PROCEDURE — 99213 OFFICE O/P EST LOW 20 MIN: CPT | Performed by: NURSE PRACTITIONER

## 2018-07-31 RX ORDER — AMOXICILLIN 875 MG
875 TABLET ORAL 2 TIMES DAILY
Qty: 14 TABLET | Refills: 0 | Status: SHIPPED | OUTPATIENT
Start: 2018-07-31 | End: 2019-02-19

## 2018-07-31 RX ORDER — OFLOXACIN 3 MG/ML
10 SOLUTION AURICULAR (OTIC) 2 TIMES DAILY
Qty: 10 ML | Refills: 0 | Status: SHIPPED | OUTPATIENT
Start: 2018-07-31 | End: 2019-02-19

## 2018-07-31 NOTE — MR AVS SNAPSHOT
After Visit Summary   7/31/2018    Silverio Stroud    MRN: [id number]           Patient Information     Date Of Birth          1995        Visit Information        Provider Department      7/31/2018 2:30 PM Dasia Arias APRN CNP; [employer] [facility]        Today's Diagnoses     Acute otitis media with perforation, left    -  1      Care Instructions    I think you have an ear infection with a ruptured eardrum    -Ear drops and oral antibiotics twice a day for 7 days  -No going under water  -See me in 1 month          Follow-ups after your visit        Who to contact     If you have questions or need follow up information about today's clinic visit or your schedule please contact [facility] directly at [phone].  Normal or non-critical lab and imaging results will be communicated to you by MyChart, letter or phone within 4 business days after the clinic has received the results. If you do not hear from us within 7 days, please contact the clinic through MyChart or phone. If you have a critical or abnormal lab result, we will notify you by phone as soon as possible.  Submit refill requests through [employer] or call your pharmacy and they will forward the refill request to us. Please allow 3 business days for your refill to be completed.          Additional Information About Your Visit        MyChart Information     [employer] gives you secure access to your electronic health record. If you see a primary care provider, you can also send messages to your care team and make appointments. If you have questions, please call your primary care clinic.  If you do not have a primary care provider, please call [phone] and they will assist you.        Care EveryWhere ID     This is your Care EveryWhere ID. This could be used by other organizations to access your [place] medical records  [id number]        Your Vitals Were     Pulse Temperature  "Height Pulse Oximetry BMI (Body Mass Index)       96 98.2  F (36.8  C) (Tympanic) 5' 4\" (1.626 m) 99% 26.95 kg/m2        Blood Pressure from Last 3 Encounters:   07/31/18 108/66   12/01/17 98/66   06/05/17 98/62    Weight from Last 3 Encounters:   07/31/18 157 lb (71.2 kg)   12/01/17 147 lb (66.7 kg)   06/05/17 150 lb (68 kg)              Today, you had the following     No orders found for display         Today's Medication Changes          These changes are accurate as of 7/31/18  2:51 PM.  If you have any questions, ask your nurse or doctor.               Start taking these medicines.        Dose/Directions    amoxicillin 875 MG tablet   Commonly known as:  AMOXIL   Used for:  Acute otitis media with perforation, left   Started by:  Dasia Arias APRN CNP        Dose:  875 mg   Take 1 tablet (875 mg) by mouth 2 times daily for 7 days   Quantity:  14 tablet   Refills:  0       ofloxacin 0.3 % otic solution   Commonly known as:  FLOXIN   Used for:  Acute otitis media with perforation, left   Started by:  Dasia Arias APRN CNP        Dose:  10 drop   Place 10 drops Into the left ear 2 times daily for 7 days   Quantity:  10 mL   Refills:  0            Where to get your medicines      These medications were sent to [facility] - [address]   [address]  [address], [place] [address]     Phone:  [phone]     amoxicillin 875 MG tablet    ofloxacin 0.3 % otic solution                Primary Care Provider Office Phone # Fax #    Dasia Atwood [phone]       [facility] [place] [address]        Equal Access to Services     [facility] AH: Atiya Haynes, waboaz luciara, caleb kaalmada allyson, sofía ruiz. [facility] [phone].    ATENCIÓN: Si habla español, tiene a london disposición servicios gratuitos de asistencia lingüística. Llame al [phone].    We " comply with applicable federal civil rights laws and [place] laws. We do not discriminate on the basis of race, color, national origin, age, disability, sex, sexual orientation, or gender identity.            Thank you!     Thank you for choosing [facility]  for your care. Our goal is always to provide you with excellent care. Hearing back from our patients is one way we can continue to improve our services. Please take a few minutes to complete the written survey that you may receive in the mail after your visit with us. Thank you!             Your Updated Medication List - Protect others around you: Learn how to safely use, store and throw away your medicines at www.disposemymeds.org.          This list is accurate as of 7/31/18  2:51 PM.  Always use your most recent med list.                   Brand Name Dispense Instructions for use Diagnosis    amoxicillin 875 MG tablet    AMOXIL    14 tablet    Take 1 tablet (875 mg) by mouth 2 times daily for 7 days    Acute otitis media with perforation, left       cholecalciferol 1000 UNIT tablet    vitamin D3    100 tablet    Take 1 tablet (1,000 Units) by mouth daily        levonorgestrel-ethinyl estradiol 0.1-20 MG-MCG per tablet    AVIANE,ALESSE,LESSINA    84 tablet    Take 1 tablet by mouth daily    DUB (dysfunctional uterine bleeding)       magnesium 250 MG tablet     30 tablet    Take 1 tablet by mouth daily        ofloxacin 0.3 % otic solution    FLOXIN    10 mL    Place 10 drops Into the left ear 2 times daily for 7 days    Acute otitis media with perforation, left

## 2018-07-31 NOTE — PROGRESS NOTES
"    Patient states she was on an airplane a few days ago and started having ringing in her left ear. Denies any pain or discharge from the ear. States hearing is slightly decreased in that ear.    ROS: const/heent/resp otherwise negative     OBJECTIVE:  /66 (BP Location: Right arm, Cuff Size: Adult Regular)  Pulse 96  Temp 98.2  F (36.8  C) (Tympanic)  Ht 5' 4\" (1.626 m)  Wt 157 lb (71.2 kg)  SpO2 99%  BMI 26.95 kg/m2  CONSTITUTIONAL: Alert, well-nourished, well-groomed, NAD  RESP: Lungs CTA. No wheeze, rhonchi, rales.  CV: HRRR S1 S2 No MRG. No peripheral edema  HEENT: Eyes: Conjunctiva pink and moist. Ears: Right ear unremarkable. Left canal unremarkable, TM completely impacted by cerumen. Irrigated by MA with some removal of wax. MA stopped when patient complained of mild pain. After irrigation TM shows perforation with some purulent drainage behind the TM. Nose: Turbinates pink and moist. Throat: OP pink and moist. No tonsillar enlargement or exudates. No postnasal drip.      ASSESSMENT/PLAN:  (H66.92,  H72.92) Acute otitis media with perforation, left  (primary encounter diagnosis)  (H93.12) Tinnitus, leftComment: Patient with presenting symptom of tinnitus. Was found to have cerumen obstruction. When irrigated, wax was removed but patient c/o mild pain.  Upon inspection, the TM was perforated with some green/purulent matter behind the eardrum. I suspect the ear was infected if not perforated prior to the irrigation. However, it could have been infected, and perforated with the ear wash.  Plan: amoxicillin (AMOXIL) 875 MG tablet, ofloxacin         (FLOXIN) 0.3 % otic solution          -Treat for AOM with perforation. Amox plus ofloxacin. Discussed ear precautions  -RTC for recheck in 1 month with hearing check. Can recheck tinnitus at that time.       Dasia Arias, FNP-DNP.             "

## 2018-07-31 NOTE — PATIENT INSTRUCTIONS
I think you have an ear infection with a ruptured eardrum    -Ear drops and oral antibiotics twice a day for 7 days  -No going under water  -See me in 1 month

## 2018-07-31 NOTE — NURSING NOTE
LEFT ear wash ATTEMPTED. Very small amount of cerumen removed from left ear. Patient complained of pain, stopped procedure and reported to provider.  Fabiola Carmichael CMA

## 2018-08-28 ENCOUNTER — OFFICE VISIT (OUTPATIENT)
Dept: PEDIATRICS | Facility: CLINIC | Age: 23
End: 2018-08-28
Payer: COMMERCIAL

## 2018-08-28 VITALS
DIASTOLIC BLOOD PRESSURE: 66 MMHG | BODY MASS INDEX: 26.56 KG/M2 | HEART RATE: 84 BPM | WEIGHT: 155.6 LBS | OXYGEN SATURATION: 98 % | HEIGHT: 64 IN | TEMPERATURE: 98.6 F | SYSTOLIC BLOOD PRESSURE: 104 MMHG

## 2018-08-28 DIAGNOSIS — H61.23 BILATERAL IMPACTED CERUMEN: Primary | ICD-10-CM

## 2018-08-28 DIAGNOSIS — E55.9 VITAMIN D DEFICIENCY: ICD-10-CM

## 2018-08-28 PROCEDURE — 99214 OFFICE O/P EST MOD 30 MIN: CPT | Performed by: PHYSICIAN ASSISTANT

## 2018-08-28 NOTE — MR AVS SNAPSHOT
"              After Visit Summary   8/28/2018    Silverio Stroud    MRN: [id number]           Patient Information     Date Of Birth          1995        Visit Information        Provider Department      8/28/2018 9:50 AM Satish Cerna PA-C [facility] [place]        Today's Diagnoses     Bilateral impacted cerumen    -  1    Vitamin D deficiency           Follow-ups after your visit        Follow-up notes from your care team     Return in about 6 months (around 2/28/2019) for Physical Exam.      Who to contact     If you have questions or need follow up information about today's clinic visit or your schedule please contact [facility] directly at [phone].  Normal or non-critical lab and imaging results will be communicated to you by TUBEhart, letter or phone within 4 business days after the clinic has received the results. If you do not hear from us within 7 days, please contact the clinic through TUBEhart or phone. If you have a critical or abnormal lab result, we will notify you by phone as soon as possible.  Submit refill requests through Bottlenose or call your pharmacy and they will forward the refill request to us. Please allow 3 business days for your refill to be completed.          Additional Information About Your Visit        MyChart Information     Bottlenose gives you secure access to your electronic health record. If you see a primary care provider, you can also send messages to your care team and make appointments. If you have questions, please call your primary care clinic.  If you do not have a primary care provider, please call [phone] and they will assist you.        Care EveryWhere ID     This is your Care EveryWhere ID. This could be used by other organizations to access your [place] medical records  [id number]        Your Vitals Were     Pulse Temperature Height Pulse Oximetry BMI (Body Mass Index)       84 98.6  F (37  C) (Tympanic) 5' 4\" (1.626 m) 98% " 26.71 kg/m2        Blood Pressure from Last 3 Encounters:   08/28/18 104/66   07/31/18 108/66   12/01/17 98/66    Weight from Last 3 Encounters:   08/28/18 155 lb 9.6 oz (70.6 kg)   07/31/18 157 lb (71.2 kg)   12/01/17 147 lb (66.7 kg)              Today, you had the following     No orders found for display       Primary Care Provider Office Phone # Fax #    Dasia Atwood [phone] [address] [place] [address]        Equal Access to Services     [facility]: Hadii aad lesley hadpratiko Soisabel, waaxda luciara, qaybta kaalmada allyson, sofía oliver . So [facility] [phone].    ATENCIÓN: Si habla español, tiene a london disposición servicios gratuitos de asistencia lingüística. Llame al [phone].    We comply with applicable federal civil rights laws and [place] laws. We do not discriminate on the basis of race, color, national origin, age, disability, sex, sexual orientation, or gender identity.            Thank you!     Thank you for choosing [facility]  for your care. Our goal is always to provide you with excellent care. Hearing back from our patients is one way we can continue to improve our services. Please take a few minutes to complete the written survey that you may receive in the mail after your visit with us. Thank you!             Your Updated Medication List - Protect others around you: Learn how to safely use, store and throw away your medicines at www.disposemymeds.org.      Notice  As of 8/28/2018 12:04 PM    You have not been prescribed any medications.

## 2018-08-28 NOTE — PROGRESS NOTES
"  SUBJECTIVE:   Silverio Stroud is a 23 year old female, who presents to clinic today for the following health issues:     present. Patient speaks Amharic.     Pt states she had ringing in her ear about a month ago. Ear irrigation resolved symptoms on 7/31/18. She is unsure if earwax persists.     Patient would also like to have vitamin D checked.     Patient is feeling well.     ROS:  ROS otherwise negative    OBJECTIVE:                                                    /66 (BP Location: Right arm, Cuff Size: Adult Regular)  Pulse 84  Temp 98.6  F (37  C) (Tympanic)  Ht 5' 4\" (1.626 m)  Wt 155 lb 9.6 oz (70.6 kg)  SpO2 98%  BMI 26.71 kg/m2  Body mass index is 26.71 kg/(m^2).   GENERAL: alert, no distress  HENT: ear canals- cerumen impactions--resolved with ear irrigations; TMs- normal; Nose- normal; Mouth- no ulcers, no lesions  NECK: no tenderness, no adenopathy  RESP: lungs clear to auscultation - no rales, no rhonchi, no wheezes  CV: regular rates and rhythm, normal S1 S2, no S3 or S4 and no murmur, no click or rub    Diagnostic test results:  No results found for this or any previous visit (from the past 24 hour(s)).     ASSESSMENT/PLAN:                                                    (H61.23) Bilateral impacted cerumen  (primary encounter diagnosis)  Comment: resolved.  Plan:     (E55.9) Vitamin D deficiency  Comment: patient has low normal levels in past. She is requesting this today, however this will likely be normal in summer months. She will begin 2000 units daily and may recheck in spring.   Plan:     Satish Cerna PA-C  [facility]  "

## 2019-01-21 ENCOUNTER — TELEPHONE (OUTPATIENT)
Dept: PEDIATRICS | Facility: CLINIC | Age: 24
End: 2019-01-21

## 2019-01-21 NOTE — TELEPHONE ENCOUNTER
Type of outreach:  Sent [employer] | 3pay message.  Health Maintenance Due   Topic Date Due     HPV IMMUNIZATION (1 - Female 3-dose series) 03/27/2005     HIV SCREEN (SYSTEM ASSIGNED)  03/27/2012     PAP SCREENING Q3 YR (SYSTEM ASSIGNED)  03/27/2015     DTAP/TDAP/TD IMMUNIZATION (2 - Td) 12/13/2016     INFLUENZA VACCINE (1) 09/01/2018     PHQ-2 Q1 YR  12/01/2018     Pt is due for annual exam with pap.  My chart message sent.    Heide Maharaj MA

## 2019-02-19 ENCOUNTER — OFFICE VISIT (OUTPATIENT)
Dept: PEDIATRICS | Facility: CLINIC | Age: 24
End: 2019-02-19

## 2019-02-19 VITALS
TEMPERATURE: 98.1 F | SYSTOLIC BLOOD PRESSURE: 102 MMHG | BODY MASS INDEX: 26.14 KG/M2 | RESPIRATION RATE: 14 BRPM | HEART RATE: 77 BPM | HEIGHT: 64 IN | WEIGHT: 153.1 LBS | OXYGEN SATURATION: 98 % | DIASTOLIC BLOOD PRESSURE: 62 MMHG

## 2019-02-19 DIAGNOSIS — Z86.39 HISTORY OF VITAMIN D DEFICIENCY: ICD-10-CM

## 2019-02-19 DIAGNOSIS — R11.0 NAUSEA: Primary | ICD-10-CM

## 2019-02-19 DIAGNOSIS — Z11.4 ENCOUNTER FOR SCREENING FOR HIV: ICD-10-CM

## 2019-02-19 LAB
BASOPHILS # BLD AUTO: 0.1 10E9/L (ref 0–0.2)
BASOPHILS NFR BLD AUTO: 1.3 %
DIFFERENTIAL METHOD BLD: ABNORMAL
EOSINOPHIL # BLD AUTO: 0.1 10E9/L (ref 0–0.7)
EOSINOPHIL NFR BLD AUTO: 1.8 %
ERYTHROCYTE [DISTWIDTH] IN BLOOD BY AUTOMATED COUNT: 15.2 % (ref 10–15)
HBA1C MFR BLD: 5.3 % (ref 0–5.6)
HCT VFR BLD AUTO: 37.3 % (ref 35–47)
HGB BLD-MCNC: 11.8 G/DL (ref 11.7–15.7)
LYMPHOCYTES # BLD AUTO: 2.4 10E9/L (ref 0.8–5.3)
LYMPHOCYTES NFR BLD AUTO: 53.7 %
MCH RBC QN AUTO: 20.9 PG (ref 26.5–33)
MCHC RBC AUTO-ENTMCNC: 31.6 G/DL (ref 31.5–36.5)
MCV RBC AUTO: 66 FL (ref 78–100)
MONOCYTES # BLD AUTO: 0.3 10E9/L (ref 0–1.3)
MONOCYTES NFR BLD AUTO: 5.8 %
NEUTROPHILS # BLD AUTO: 1.7 10E9/L (ref 1.6–8.3)
NEUTROPHILS NFR BLD AUTO: 37.4 %
PLATELET # BLD AUTO: 241 10E9/L (ref 150–450)
RBC # BLD AUTO: 5.64 10E12/L (ref 3.8–5.2)
WBC # BLD AUTO: 4.5 10E9/L (ref 4–11)

## 2019-02-19 PROCEDURE — 85025 COMPLETE CBC W/AUTO DIFF WBC: CPT | Performed by: NURSE PRACTITIONER

## 2019-02-19 PROCEDURE — 80053 COMPREHEN METABOLIC PANEL: CPT | Performed by: NURSE PRACTITIONER

## 2019-02-19 PROCEDURE — 83036 HEMOGLOBIN GLYCOSYLATED A1C: CPT | Performed by: NURSE PRACTITIONER

## 2019-02-19 PROCEDURE — 82306 VITAMIN D 25 HYDROXY: CPT | Performed by: NURSE PRACTITIONER

## 2019-02-19 PROCEDURE — 36415 COLL VENOUS BLD VENIPUNCTURE: CPT | Performed by: NURSE PRACTITIONER

## 2019-02-19 PROCEDURE — 87389 HIV-1 AG W/HIV-1&-2 AB AG IA: CPT | Performed by: NURSE PRACTITIONER

## 2019-02-19 PROCEDURE — 99213 OFFICE O/P EST LOW 20 MIN: CPT | Performed by: NURSE PRACTITIONER

## 2019-02-19 ASSESSMENT — MIFFLIN-ST. JEOR: SCORE: 1429.46

## 2019-02-19 NOTE — PROGRESS NOTES
"  SUBJECTIVE:   Silverio Stroud is a 23 year old female who presents to clinic today for the following health issues:    Nausea/Vomiting      Duration: 3 weeks    Description (location/character/radiation): 2 episodes of vomiting before bed, other days having nausea, always happening before bedtime    Intensity:  Intermittent, mild to severe when present, usually lasting several minutes, always less than 1 hour    Accompanying signs and symptoms: dizziness sometimes with nausea, diarrhea this morning    History (similar episodes/previous evaluation): None    Precipitating or alleviating factors: None    Therapies tried and outcome: None     Patient never sexually active,  last day of menses today - menses have been irregular. Had at beginning of month and again last Wednesday - heavy.    Started chelated  because her mother said this would help with vitamin D levels. Doesn't know what her vitamin D levels are. States the nausea started a few days after starting the chelated copper, and usually occurs within an hour of taking the supplement at night.    Has been able to eat during the day. No blood in the emesis, blood in the stool, weight loss, etc.     ROS: const/gi/gyn otherwise negative     OBJECTIVE:  /62 (BP Location: Right arm, Cuff Size: Adult Regular)   Pulse 77   Temp 98.1  F (36.7  C) (Tympanic)   Resp 14   Ht 1.626 m (5' 4\")   Wt 69.4 kg (153 lb 1.6 oz)   LMP 02/13/2019 (Approximate)   SpO2 98%   BMI 26.28 kg/m    CONSTITUTIONAL: Alert, well-nourished, well-groomed, NAD  RESP: Lungs CTA. No wheeze, rhonchi, rales.  CV: HRRR S1 S2 No MRG. No peripheral edema  GI: Abdomen flat. BS x 4. No TTP. No HSM or masses. No CVAT      ASSESSMENT/PLAN:  (R11.0) Nausea  (primary encounter diagnosis)  Comment: Symptom seems clearly related to starting chelated copper, which she is taking for misguided reasons, thinking it would help with vitamin D. Adamantly denies being sexually active. Will not do UPT " today.   Plan: Hemoglobin A1c, CBC with platelets         differential, Comprehensive metabolic panel,         HIV Antigen Antibody Combo  -Stop supplement immediately  -Call me next week if symptoms have not completely resolved-will consider further workup.    (Z11.4) Encounter for screening for HIV  Routine screen     BENEDICTO Kate-DNP.

## 2019-02-20 LAB
ALBUMIN SERPL-MCNC: 4.3 G/DL (ref 3.4–5)
ALP SERPL-CCNC: 84 U/L (ref 40–150)
ALT SERPL W P-5'-P-CCNC: 28 U/L (ref 0–50)
ANION GAP SERPL CALCULATED.3IONS-SCNC: 5 MMOL/L (ref 3–14)
AST SERPL W P-5'-P-CCNC: 27 U/L (ref 0–45)
BILIRUB SERPL-MCNC: 0.5 MG/DL (ref 0.2–1.3)
BUN SERPL-MCNC: 10 MG/DL (ref 7–30)
CALCIUM SERPL-MCNC: 9.1 MG/DL (ref 8.5–10.1)
CHLORIDE SERPL-SCNC: 103 MMOL/L (ref 94–109)
CO2 SERPL-SCNC: 28 MMOL/L (ref 20–32)
CREAT SERPL-MCNC: 0.71 MG/DL (ref 0.52–1.04)
DEPRECATED CALCIDIOL+CALCIFEROL SERPL-MC: 34 UG/L (ref 20–75)
GFR SERPL CREATININE-BSD FRML MDRD: >90 ML/MIN/{1.73_M2}
GLUCOSE SERPL-MCNC: 74 MG/DL (ref 70–99)
HIV 1+2 AB+HIV1 P24 AG SERPL QL IA: NONREACTIVE
POTASSIUM SERPL-SCNC: 3.9 MMOL/L (ref 3.4–5.3)
PROT SERPL-MCNC: 8 G/DL (ref 6.8–8.8)
SODIUM SERPL-SCNC: 136 MMOL/L (ref 133–144)

## 2019-05-07 ENCOUNTER — TELEPHONE (OUTPATIENT)
Dept: PEDIATRICS | Facility: CLINIC | Age: 24
End: 2019-05-07

## 2019-05-07 NOTE — TELEPHONE ENCOUNTER
Panel Management Review      Patient has the following on her problem list: None      Composite cancer screening  Chart review shows that this patient is due/due soon for the following Pap Smear  Summary:    Patient is due/failing the following:   PAP and PHYSICAL    Action needed:   Patient needs office visit for physical and pap.    Type of outreach:    Phone, left message for patient to call back.     Questions for provider review:    None                                                                                                                                  Fabiola Carmichael CMA    Chart routed to Care Team .

## 2019-05-07 NOTE — LETTER
May 13, 2019      Silverio Stroud  [address] [place] [address]        Dear Silverio,       We care about your health and have reviewed your health plan including your medical conditions, medications, and lab results.  Based on this review, it is recommended that you follow up regarding the following health topic(s):  -Cervical Cancer Screening  -Wellness (Physical) Visit     We recommend you take the following action(s):  -schedule a WELLNESS (Physical) APPOINTMENT.  We will perform the following labs: pap smear.     Please call us at the [facility] - [phone] (or use [employer]) to address the above recommendations.     Thank you for trusting [facility] and we appreciate the opportunity to serve you.  We look forward to supporting your healthcare needs in the future.    Healthy Regards,    Your Health Care Team  [facility]

## 2020-03-10 ENCOUNTER — HEALTH MAINTENANCE LETTER (OUTPATIENT)
Age: 25
End: 2020-03-10

## 2020-12-27 ENCOUNTER — HEALTH MAINTENANCE LETTER (OUTPATIENT)
Age: 25
End: 2020-12-27

## 2021-04-24 ENCOUNTER — HEALTH MAINTENANCE LETTER (OUTPATIENT)
Age: 26
End: 2021-04-24

## 2021-10-04 ENCOUNTER — HEALTH MAINTENANCE LETTER (OUTPATIENT)
Age: 26
End: 2021-10-04

## 2022-02-10 NOTE — NURSING NOTE
"Chief Complaint   Patient presents with     RECHECK       Initial BP 94/66 (BP Location: Right arm, Patient Position: Chair, Cuff Size: Adult Regular)  Pulse 89  Temp 98.5  F (36.9  C) (Oral)  Wt 159 lb (72.1 kg)  LMP 01/27/2017  SpO2 99%  Breastfeeding? No  BMI 27.29 kg/m2 Estimated body mass index is 27.29 kg/(m^2) as calculated from the following:    Height as of 2/17/17: 5' 4\" (1.626 m).    Weight as of this encounter: 159 lb (72.1 kg).  Medication Reconciliation: complete  "
16

## 2022-05-15 ENCOUNTER — HEALTH MAINTENANCE LETTER (OUTPATIENT)
Age: 27
End: 2022-05-15

## 2022-09-11 ENCOUNTER — HEALTH MAINTENANCE LETTER (OUTPATIENT)
Age: 27
End: 2022-09-11

## 2022-11-17 NOTE — LETTER
----- Message from Mayelin Fernandez sent at 11/17/2022  9:06 AM EST -----  Morning! This patient walked into the office yesterday with her , she is a Dr. Shira Gregory patient. She needs you to give her a call because the patient is having dental cleaning done I believe Friday, she has a valve and the dentist needs to make sure the patient can have her cleaning done even with her recent valve that was placed. If you can please call the patient today to make sure she is ok to have her dental cleaning done, that would be greatly appreciated. You can call patient at [phone]. Thank you! [facility]  [address]  [address]  [place] [address]  [phone]      March 16, 2017      Silverio Stroud  [address] [place] [address]        To whom it may concern:    Silverio is under my professional medical care for ongoing costochondritis pain.  She has been seen by myself on the following dates:    3/16/17  3/6/17  2/17/17    And she was previously seen by my partners for ongoing fatigue:    2/14/17  12/6/16  11/15/16    Please allow Silverio to take a reduced course load of credits for up to the next 12 months.    Please call with any questions or concerns.      Sincerely,        Dasia Atwood MD  Internal Medicine/Pediatrics  [facility]

## 2023-06-03 ENCOUNTER — HEALTH MAINTENANCE LETTER (OUTPATIENT)
Age: 28
End: 2023-06-03